# Patient Record
Sex: MALE | Race: WHITE
[De-identification: names, ages, dates, MRNs, and addresses within clinical notes are randomized per-mention and may not be internally consistent; named-entity substitution may affect disease eponyms.]

---

## 2017-07-14 ENCOUNTER — HOSPITAL ENCOUNTER (INPATIENT)
Dept: HOSPITAL 80 - F3E | Age: 62
LOS: 2 days | Discharge: HOME | DRG: 669 | End: 2017-07-16
Attending: SPECIALIST | Admitting: SPECIALIST
Payer: COMMERCIAL

## 2017-07-14 DIAGNOSIS — I10: ICD-10-CM

## 2017-07-14 DIAGNOSIS — Z79.4: ICD-10-CM

## 2017-07-14 DIAGNOSIS — N99.71: ICD-10-CM

## 2017-07-14 DIAGNOSIS — N17.9: ICD-10-CM

## 2017-07-14 DIAGNOSIS — I25.10: ICD-10-CM

## 2017-07-14 DIAGNOSIS — E10.9: ICD-10-CM

## 2017-07-14 DIAGNOSIS — Z95.5: ICD-10-CM

## 2017-07-14 DIAGNOSIS — N20.1: Primary | ICD-10-CM

## 2017-07-14 DIAGNOSIS — Z86.73: ICD-10-CM

## 2017-07-14 DIAGNOSIS — G47.33: ICD-10-CM

## 2017-07-14 DIAGNOSIS — Z96.41: ICD-10-CM

## 2017-07-14 LAB
ANION GAP SERPL CALC-SCNC: 12 MEQ/L (ref 8–16)
CALCIUM SERPL-MCNC: 9.6 MG/DL (ref 8.5–10.4)
CHLORIDE SERPL-SCNC: 105 MEQ/L (ref 97–110)
CO2 SERPL-SCNC: 23 MEQ/L (ref 22–31)
CREAT SERPL-MCNC: 1.6 MG/DL (ref 0.7–1.3)
ERYTHROCYTE [DISTWIDTH] IN BLOOD BY AUTOMATED COUNT: 12.7 % (ref 11.5–15.2)
EST. AVERAGE GLUCOSE BLD GHB EST-MCNC: 148 MG/DL (ref 68–126)
GFR SERPL CREATININE-BSD FRML MDRD: 44 ML/MIN/{1.73_M2}
GLUCOSE SERPL-MCNC: 105 MG/DL (ref 70–100)
HBA1C MFR BLD: 6.8 % (ref 4–6)
HCT VFR BLD CALC: 35.3 % (ref 40–51)
HGB BLD-MCNC: 11.9 G/DL (ref 13.7–17.5)
LIPEMIA HEMOLYSIS FLAG: 80 (ref 0–99)
MCH RBC BLDCO QN: 30.9 PG (ref 27.9–34.1)
MCHC RBC AUTO-ENTMCNC: 33.7 G/DL (ref 32.4–36.7)
MCV RBC AUTO: 91.7 FL (ref 81.5–99.8)
PLATELET # BLD: 163 10^3/UL (ref 150–400)
PLATELET CLUMPS FLAG: 0 (ref 0–99)
POTASSIUM SERPL-SCNC: 4.3 MEQ/L (ref 3.5–5.2)
RBC # BLD AUTO: 3.85 10^6/UL (ref 4.4–6.38)
SODIUM SERPL-SCNC: 140 MEQ/L (ref 134–144)

## 2017-07-14 PROCEDURE — C2625 STENT, NON-COR, TEM W/DEL SY: HCPCS

## 2017-07-14 PROCEDURE — G0378 HOSPITAL OBSERVATION PER HR: HCPCS

## 2017-07-14 PROCEDURE — C1726 CATH, BAL DIL, NON-VASCULAR: HCPCS

## 2017-07-14 PROCEDURE — C1758 CATHETER, URETERAL: HCPCS

## 2017-07-14 PROCEDURE — C1769 GUIDE WIRE: HCPCS

## 2017-07-14 NOTE — GCON
[f rep st]



                                                                    CONSULTATION





DATE OF CONSULTATION:  07/14/2017



REFERRING PHYSICIAN:  Chip Valencia MD



REASON FOR CONSULTATION:  Medical management.



HISTORY OF PRESENT ILLNESS:  This is a 61-year-old male with a history of type 1 diabetes, coronary 
artery disease and previous strokes among other chronic medical problems.  He does have a history of
 nephrolithiasis.  He has been having pain for about a week and went to the [facility] Emergency
 Department.  At that time he was diagnosed with a large right ureteral stone.  He is being admitted
 for definitive procedure tomorrow by Dr. Valencia.  He says he has had some low-grade fevers, but no 
chills.  He had some pain that started in his groin and now remains in his flank although he is comf
ortable at the time.  He has had some nausea and vomiting.  No chest pain or shortness of breath.



REVIEW OF SYSTEMS:  A 10-point review of systems was obtained, and other than stated above is negati
ve.



PAST MEDICAL HISTORY:  

1.  Previous nephrolithiasis.

2.  History of coronary artery disease with multiple stents, with the last stent done in March of 20
16.

3.  Type 1 diabetes on insulin pump.

4.  History of CVA x3.

5.  History of patent foramen ovale.

6.  Obstructive sleep apnea on CPAP.

7.  History of myasthenia gravis.

8.  Hyperthyroidism.

9.  Hypertension.

10.  Hyperlipidemia.



MEDICATIONS:  Reviewed.



SOCIAL HISTORY:  No smoking or alcohol.



FAMILY HISTORY:  Dad with type 2 diabetes.  No coronary artery disease.



PAST SURGICAL HISTORY:  

1.  Hiatal hernia x 2.

2.  Right shoulder surgery. 

3.  Carpal tunnel surgery. 

4.  Right inguinal surgery.

5.  Kidney stones.



PHYSICAL EXAM:  VITAL SIGNS:  Afebrile, blood pressure is 160/66, heart rate 86, oxygen saturation 9
1% on room air.  GENERAL:  The patient is well developed, in no apparent distress.  HEENT:  Nonicter
ic sclerae.  Extraocular movements intact.  Moist mucous membranes.  NECK:  Supple.  No thyromegaly.
  LUNGS:  Good effort.  Clear to auscultation bilaterally.  CARDIOVASCULAR:  Regular rate and rhythm
.  No murmurs, gallops.  ABDOMEN:  Positive bowel sounds.  Soft, nontender, nondistended.  No hepato
splenomegaly.  EXTREMITIES:  No clubbing, cyanosis, or edema.  SKIN:  Without rash.  Warm, intact.  
NEUROLOGIC:  Alert and oriented x3.  Moving all 4 extremities equally.  PSYCHIATRIC:  Normal affect.



DIAGNOSTIC DATA:  Labs: White count 8, hemoglobin 12.  Chemistry does show creatinine 1.6 with a bas
gopal of 0.9.



ASSESSMENT:  This is a 61-year-old male presenting with obstructing right kidney stone.

1.  Right kidney stone.  The patient will be undergoing a procedure tomorrow.  I assume that his wor
kup for infection was negative at [facility] a few days ago.  We will check a urinalysis while wendi perez is here and make sure there is no infection.

2.  History of type 1 diabetes with some element of insulin resistance.  The patient is comfortable 
managing his insulin pump.  We will continue to let him do that while in the hospital. I have held h
is metformin because his creatinine is a little bit elevated.

3.  History of coronary artery disease.  This is stable.  Since his last stent was over a year ago, 
we will hold his Plavix and hold aspirin for a day or 2 and around the procedure.

4.  Acute renal failure.  Probably due to obstructing stone.  We will continue to monitor after his 
stone is removed.

5.  History of obstructive sleep apnea.  We will continue CPAP at night.  

6.  Hypertension.  We will continue metoprolol.  Will hold losartan due to his renal failure.

7.  Deep venous thrombosis prophylaxis will start after surgery. 

Thank you for this consultation.  We will follow along with you.





Job #:  [id number]

## 2017-07-15 LAB
% IMMATURE GRANULYOCYTES: 0.4 % (ref 0–1.1)
ABSOLUTE IMMATURE GRANULOCYTES: 0.03 10^3/UL (ref 0–0.1)
ABSOLUTE NRBC COUNT: 0 10^3/UL (ref 0–0.01)
ADD DIFF?: NO
ADD MORPH?: NO
ADD SCAN?: NO
ANION GAP SERPL CALC-SCNC: 11 MEQ/L (ref 8–16)
ATYPICAL LYMPHOCYTE FLAG: 10 (ref 0–99)
BACTERIA #/AREA URNS HPF: (no result) /HPF
CALCIUM SERPL-MCNC: 9 MG/DL (ref 8.5–10.4)
CHLORIDE SERPL-SCNC: 104 MEQ/L (ref 97–110)
CO2 SERPL-SCNC: 25 MEQ/L (ref 22–31)
COLOR UR: (no result)
CREAT SERPL-MCNC: 1.7 MG/DL (ref 0.7–1.3)
ERYTHROCYTE [DISTWIDTH] IN BLOOD BY AUTOMATED COUNT: 12.9 % (ref 11.5–15.2)
FRAGMENT RBC FLAG: 0 (ref 0–99)
GFR SERPL CREATININE-BSD FRML MDRD: 41 ML/MIN/{1.73_M2}
GLUCOSE SERPL-MCNC: 79 MG/DL (ref 70–100)
HCT VFR BLD CALC: 33.8 % (ref 40–51)
HGB BLD-MCNC: 11.1 G/DL (ref 13.7–17.5)
LEFT SHIFT FLG: 0 (ref 0–99)
LIPEMIA HEMOLYSIS FLAG: 80 (ref 0–99)
MCH RBC BLDCO QN: 30.6 PG (ref 27.9–34.1)
MCHC RBC AUTO-ENTMCNC: 32.8 G/DL (ref 32.4–36.7)
MCV RBC AUTO: 93.1 FL (ref 81.5–99.8)
MUCOUS THREADS #/AREA URNS LPF: (no result) /LPF
NITRITE UR QL STRIP: NEGATIVE
NRBC-AUTO%: 0 % (ref 0–0.2)
PH UR STRIP: 5 [PH] (ref 5–7.5)
PLATELET # BLD: 161 10^3/UL (ref 150–400)
PLATELET CLUMPS FLAG: 0 (ref 0–99)
PMV BLD AUTO: 9.9 FL (ref 8.7–11.7)
POTASSIUM SERPL-SCNC: 4.4 MEQ/L (ref 3.5–5.2)
RBC # BLD AUTO: 3.63 10^6/UL (ref 4.4–6.38)
RBC #/AREA URNS HPF: (no result) /HPF (ref 0–3)
SODIUM SERPL-SCNC: 140 MEQ/L (ref 134–144)
SP GR UR STRIP: 1.01 (ref 1–1.03)
WBC #/AREA URNS HPF: (no result) /HPF (ref 0–3)

## 2017-07-15 PROCEDURE — 0T768DZ DILATION OF RIGHT URETER WITH INTRALUMINAL DEVICE, VIA NATURAL OR ARTIFICIAL OPENING ENDOSCOPIC: ICD-10-PCS | Performed by: SPECIALIST

## 2017-07-15 PROCEDURE — 0TC68ZZ EXTIRPATION OF MATTER FROM RIGHT URETER, VIA NATURAL OR ARTIFICIAL OPENING ENDOSCOPIC: ICD-10-PCS | Performed by: SPECIALIST

## 2017-07-15 RX ADMIN — PHENAZOPYRIDINE HYDROCHLORIDE SCH MG: 200 TABLET ORAL at 20:58

## 2017-07-15 RX ADMIN — Medication PRN MCG: at 11:40

## 2017-07-15 RX ADMIN — PHENAZOPYRIDINE HYDROCHLORIDE SCH MG: 200 TABLET ORAL at 13:12

## 2017-07-15 RX ADMIN — ATORVASTATIN CALCIUM SCH MG: 10 TABLET, FILM COATED ORAL at 13:14

## 2017-07-15 RX ADMIN — PHENAZOPYRIDINE HYDROCHLORIDE SCH MG: 200 TABLET ORAL at 15:50

## 2017-07-15 RX ADMIN — METOPROLOL SUCCINATE SCH MG: 25 TABLET, EXTENDED RELEASE ORAL at 13:15

## 2017-07-15 RX ADMIN — Medication PRN MCG: at 11:18

## 2017-07-15 RX ADMIN — TAMSULOSIN HYDROCHLORIDE SCH MG: 0.4 CAPSULE ORAL at 13:13

## 2017-07-15 NOTE — PDANEPAE
ANE History of Present Illness





61-yo M for Ureteroscopy





ANE Past Medical History





- Cardiovascular History


Hx Hypertension: Yes


Hx Arrhythmias: No


Hx Chest Pain: No


Hx Coronary Artery / Peripheral Vascular Disease: Yes


Hx CHF / Valvular Disease: No


Hx Palpitations: No


Cardiovascular History Comment: HTN.  CAD.  MI WITH STENT PLACEMENT X1





- Pulmonary History


Hx COPD: No


Hx Asthma/Reactive Airway Disease: No


Hx Recent Upper Respiratory Infection: No


Hx Oxygen in Use at Home: No


Hx Sleep Apnea: Yes


Sleep Apnea Screening Result - Last Documented: Positive


Pulmonary History Comment: ELEAZAR POSITIVE





- Neurologic History


Hx Cerebrovascular Accident: Yes


Hx Seizures: Yes


Hx Dementia: No


Neurologic History Comment: CVA IN 2006 X3.  SEIZURES FOR 6 MONTHS FOLLOWING CVA





- Endocrine History


Hx Diabetes: Yes


Endocrine History Comment: DM





- Renal History


Hx Renal Disorders: Yes


Renal History Comment: RIGHT URETEROSCOPY WITH KOREY 4/2/15





- Liver History


Hx Hepatic Disorders: No





- Neurological & Psychiatric Hx


Hx Neurological and Psychiatric Disorders: No





- Cancer History


Hx Cancer: No





- Congenital Disorder History


Hx Congenital Disorders: No





- GI History


Hx Gastrointestinal Disorders: No





- Other Health History


Other Health History: NONE





- Chronic Pain History


Chronic Pain: No





- Surgical History


Prior Surgeries: RIGHT URETEROSCOPY WITH DR NAIR 4/2/15





PABLO Review of Systems





- Exercise capacity


METS (RN): 3 METS





ANE Patient History





- Allergies


Allergies/Adverse Reactions: 








caffeine Allergy (Verified 04/01/15 19:19)


 


Sulfa (Sulfonamide Antibiotics) Allergy (Verified 04/01/15 11:52)


 








- Home Medications


Home Medications: 








Multivitamins [Multivitamin (*)] 1 each PO DAILY 04/01/15 [Last Taken 07/14/17]


Aspirin [Aspirin 325 mg (*)] 325 mg PO DAILY 10/28/15 [Last Taken 07/13/17]


Metoprolol Succinate Xr [Toprol Xl 25 mg (*)] 25 mg PO DAILY 10/28/15 [Last 

Taken 07/14/17]


Simvastatin [Zocor] 20 mg PO DAILY 10/28/15 [Last Taken 07/14/17]


Insulin Pump, Patient Own 1 ea MISC AD 09/09/16 [Last Taken 07/14/17]


metFORMIN HCL [Glucophage 500 mg (*)] 1,000 mg PO BIDMEAL 09/09/16 [Last Taken 

07/14/17 1 TAB]


ARIPiprazole [Abilify 5 mg (*)] 5 mg PO DAILY 07/14/17 [Last Taken 07/14/17]


Cholecalciferol Vit D3 [Vitamin D3 (*)] 2,000 units PO DAILY 07/14/17 [Last 

Taken 07/14/17]


FLUoxetine [Prozac 10 MG (*)] 10 mg PO HS MDD 30MG 07/14/17 [Last Taken 07/13/17

]


FLUoxetine [Prozac 20 MG (*)] 20 mg PO HS MDD 30MG 07/14/17 [Last Taken 07/14/17

]


Losartan Potassium 100 mg PO DAILY 07/14/17 [Last Taken 07/14/17]


Omega-3 Fatty Acids [Fish Oil 1000 mg (*)] 1,000 mg PO TID 07/14/17 [Last Taken 

Unknown]


Tamsulosin HCl [Flomax 0.4 MG (*)] 0.4 mg PO DAILY 07/14/17 [Last Taken 07/14/17

]








- NPO status


NPO Since - Liquids (Date): 07/15/17


NPO Since - Liquids (Time): 00:00


NPO Since - Solids (Date): 07/15/17


NPO Since - Solids (Time): 00:00





- Smoking Hx


Smoking Status: Never smoked





- Family Anes Hx


Family Hx Anesthesia Complications: NONE





ANE Labs/Vital Signs





- Labs


Result Diagrams: 


 07/15/17 04:30





 07/15/17 04:30





- Vital Signs


Blood Pressure: 137/65


Heart Rate: 84


Respiratory Rate: 16


O2 Sat (%): 93


Height: 172.72 cm


Weight: 116.2 kg





ANE Physical Exam





- Airway


Neck exam: FROM


Mallampati Score: Class 2


Mouth exam: normal dental/mouth exam





- Pulmonary


Pulmonary: clear to auscultation





- Cardiovascular


Cardiovascular: regular rate and rhythym





- ASA Status


ASA Status: III





ANE Anesthesia Plan


Anesthesia Plan: GA w LMA

## 2017-07-15 NOTE — POSTANESTH
Post Anesthetic Evaluation


Cardiovascular Status: Normal, Stable


Respiratory Status: Normal, Stable


Level of Consciousness/Mental Status: Can Participate in Eval, Alert and 

Oriented


Pain Control: Adequate, Prn Tx Ordered


Nausea/Vomiting Control: Adequate, Prn Tx Ordered

## 2017-07-15 NOTE — HOSPPROG
Hospitalist Progress Note


Assessment/Plan: 





# nephrolithiasis s/p stone extraction, stent placement by Dr Valencia


   - small distal ureteral perforation


# KARLY - SCr still elevated; unclear if d/t stone or pre-renal


   - bolus 1L, check ULytes, recheck


# DM1 - on insulin pump, A1c 6.8, glucs ok


# CAD s/p stents, last 3/2016 - temporarily holding asa/plavix, restart soon


   - cont statin/metop


# CVA - statin/plavix/asa


# htn - metop








Subjective: s/p lithotripsy; denies pain


Objective: 


 Vital Signs











Temp Pulse Resp BP Pulse Ox


 


 36.6 C   88   16   130/65 H  85 L


 


 07/15/17 14:14  07/15/17 14:14  07/15/17 14:14  07/15/17 14:14  07/15/17 14:14








 Laboratory Results





 07/15/17 04:30 





 07/15/17 04:30 





 











 07/14/17 07/15/17 07/16/17





 05:59 05:59 05:59


 


Intake Total   1586


 


Output Total  700 850


 


Balance  -700 736








chart reviewed; op note reviewed





- Physical Exam


Constitutional: no apparent distress, appears nourished


Cardiovascular: regular rate and rhythym, no murmur, rub, or gallop


Respiratory: no respiratory distress, no rales or rhonchi, clear to auscultation


Gastrointestinal: normoactive bowel sounds, soft, non-tender abdomen, no 

palpable masses, other (umbilical hernia)





ICD10 Worksheet


Patient Problems: 


 Problems











Problem Status Onset


 


Obstructive uropathy Acute  


 


CAD (coronary artery disease) Acute  


 


History of coronary artery disease Acute  


 


Chest pain Acute

## 2017-07-15 NOTE — GOP
[f 
rep st]



                                                                OPERATIVE REPORT





DATE OF OPERATION:  07/15/2017



SURGEON:  Chip Valencia MD



ANESTHESIA:  Laryngeal mask.



PREOPERATIVE DIAGNOSIS:  Symptomatic large right ureteral calculus.



POSTOPERATIVE DIAGNOSIS:  Symptomatic multiple right ureteral calculi.



PROCEDURE PERFORMED:  

1.  Cystourethroscopy, right retrograde pyelography.

2.  Right ureteroscopy with holmium laser calculus lithotripsy.

3.  Right ureteral stent placement (4.7-[place] multilink).



FINDINGS:  Multiple right ureteral calculi, addressed as noted below.



SPECIMENS:  None.



ESTIMATED BLOOD LOSS:  Minimal.



INDICATIONS:  This gentleman was admitted yesterday from the clinic due to 
symptoms related to a large ureteral calculus seen on CT scan.  The patient 
presents for operative management at this time.  The indications for the 
procedures, as well as potential risks and complications, were discussed with 
the patient preoperatively.  He appeared to understand, his questions were 
answered, and he wished to proceed.  Written informed surgical consent was 
thereafter obtained.



DESCRIPTION OF PROCEDURE:  Once the patient was brought to the operating room, 
he was administered laryngeal mask anesthesia.  He was carefully placed in the 
dorsal lithotomy position on the cystoscopic table.  The genital area was 
sterilely prepped with Betadine scrub and paint then draped in usual sterile 
fashion.  Cystoscopy was performed with a 30-degree lens through a 22-[place]er 
sheath.  Anterior urethra revealed no abnormalities.  Posterior urethra 
revealed mild lateral lobe BPH.  The bladder was moderately trabeculated 
without any areas of abnormal erythema, tumors, nor foreign bodies.  Ureteral 
orifices were normal in regards to shape and position.  Spot fluoroscopy was 
used to try and identify the location of the calculus prior to injection of 
contrast, but the calculus could not be easily visualized.  I then injected 
contrast into the right ureter through a 5-[place] open-ended ureteral catheter 
to perform retrograde pyelography.  This revealed no passage of contrast 
proximal to the distal ureter at about the level of the pelvic inlet.  I then 
was able to pass a 0.035-inch angle-tipped hydrophilic guidewire up the right 
ureter, with the aid of the 5-[place] open-ended ureteral catheter, and advanced 
it proximal to the obstruction and into the renal collecting system as noted 
fluoroscopically.  Immediately upon doing so, there was a significant amount of 
brownish urine that exited the ureteral orifice that was likely due to 
significant urinary stasis and obstruction from the large ureteral calculus.  I 
then dilated the ureter distal to the calculus with a 4 cm balloon by 
maintaining a pressure of 16 atmospheres for about 4 minutes.  The balloon 
dilator and cystoscope were then removed while keeping the guidewire in place.  
Semi-rigid ureteroscopy was performed alongside the guidewire. 



The ureteral mucosa appeared to be fairly friable in general.  I reached the 
portion of the ureter that was at the level of the pelvic inlet and there was 
significant edema and inflammation at this location.  I carefully advanced the 
ureteroscope further proximally, with the aid of an additional guidewire 
through the ureteroscope.  Upon doing this, a small perforation of the ureter 
occurred near the pelvic inlet in the posteromedial position.  I was able to re-
advance the ureteroscope through the true lumen without difficulty thereafter, 
and without inducing further injury to the ureter.  Upon passing through this 
inflamed and edematous portion of the ureter, I was able identify a very large 
calculus in the ureter along the mid portion.  A 365-micron holmium laser fiber 
was used to fragment this calculus into as small pieces as possible.  Upon 
doing this, I then identified several other smaller calculi that were just 
proximal to the large dominant calculus.  These smaller calculi measured 
roughly 2-3 mm in size each.  I used the holmium laser fiber to fragment all 
the calculi that were visualized into as small fragments as possible.  I did 
not utilize a stone basket in order to minimize risk of further injury to the 
ureter.  Once the calculi had been successfully fragmented, the ureteroscope 
was removed and the cystoscope was back-loaded over the guidewire.  The 5-
[place] open-ended ureteral catheter was then used to perform retrograde 
pyelography on the right side.  This revealed mild-to-moderate hydronephrosis 
and some mild hydroureter.  There was no obvious extravasation seen from the 
ureter at this point.  It should also be noted that as I was backing out the 
ureteroscope following laser lithotripsy, I could not definitively see the 
location of the small perforation.  Once retrograde pyelography was completed, 
a 4.7-[place] multilength hydrophilic ureteral stent was advanced over the 
guidewire until it was properly positioned as seen fluoroscopically in the 
kidney and cystoscopically in the bladder.  The urine return at this point was 
light pink.  The instruments were removed and 20 cc of 2% lidocaine injected 
transurethrally for postoperative analgesic purposes.  The patient was then 
awakened, transferred to his bed, then taken to the recovery room.  He 
tolerated the procedure well overall.



COMPLICATIONS:  Small ureteral perforation along the distal aspect in the 
posteromedial portion of the ureter.



DISPOSITION:  He was transferred to the recovery room in stable condition.  He 
will be discharged once he has been deemed to be doing well, either this 
evening or tomorrow.





Job #:  [id number]

MTDD

## 2017-07-15 NOTE — POSTOPPROG
Post Op Note


Date of Operation: 07/15/17 (H&P # [id number])


Surgeon: Chip Valencia (Op # [id number])


Anesthesia: LMA


Pre-op Diagnosis: Right ureteral calculus


Post-op Diagnosis: Multiple right ureteral calculi


Procedure: Ureteroscopy w/ laser lithotripsy, stent placement


Findings: See op note


Inf/Abcess present in the surg proc area at time of surgery?: No


EBL: Minimal


Complications: 





Small distal ureteral perforation


Drains: Other (4.7 Fr. multilength right ureteral stent)

## 2017-07-15 NOTE — GHP
[f rep st]



                                                            HISTORY AND PHYSICAL





DATE OF ADMISSION:  07/14/2017



CHIEF COMPLAINT:  Symptomatic right ureteral calculus.



HISTORY OF PRESENT ILLNESS:  This is a 61-year-old gentleman, well known to my practice with a longs
tanding history of recurrent nephroureterolithiasis.  He started experiencing significant right-side
d flank and abdominal pain earlier this week, for which he has been to the emergency room at [facility] twice.  CT scan at that time revealed a large right midureteral calculus with moder
ate proximal hydronephrosis and hydroureter.  Multiple other renal calculi were also seen.  The augusto
ent presented to my office on 07/14 and was noted to have intermittently severe symptoms.  Because o
f the size of the calculus and concern that he could be developing an infection, the patient was adm
itted to the hospital for further management including surgical treatment of the ureteral calculi.  
The patient did have a low-grade temperature of 100 degrees the day prior to presentation to my offi
ce.  He denies any dysuria, gross hematuria, or changes in his voiding pattern.



PAST MEDICAL HISTORY:  Notable for recurrent nephroureterolithiasis, myasthenia gravis, high blood p
ressure, high cholesterol, heart disease, gastroesophageal reflux disease, diabetes mellitus, bipola
r disorder, history of a cerebrovascular accident x3, depression, patent foramina ovale, seizure dis
order, sleep apnea.



PAST SURGICAL HISTORY:  Includes ureteroscopy on multiple occasions (most recently in March 2015), e
xtracorporeal shockwave lithotripsy in November 2006, multiple cardiac stents placed (most recently 
March 2016), hiatal hernia repair, right inguinal hernia repair in March 2008.



ADMISSION MEDICATIONS:  Include aspirin 325 mg daily, Plavix, Prevacid, simvastatin, metoprolol, los
beverly, insulin pump, fluoxetine.



MEDICAL ALLERGIES:  Sulfa causes anaphylaxis, caffeine causes severe vomiting and diarrhea.



FAMILY HISTORY:  Noncontributory.



SOCIAL HISTORY:  The patient is  and lives in the [place] area.  He has 2 children.  He de
nies use of tobacco products and consumes an occasional beer.



PHYSICAL EXAMINATION:  GENERAL:  Obese white male, lying supine in bed, in no acute distress present
ly.  He appears older than his stated age.  HEENT:  Normocephalic, atraumatic.  VITAL SIGNS:  Stable
, temperature 37.9 Celsius at 6:15 p.m. on admission 7/14.  He has been essentially afebrile since t
hen.  Height 172 cm, weight 116 kg, BMI 39.  ABDOMEN:  Obese with large umbilical hernia.  Mild tend
erness is noted on the right side without peritoneal signs.  HEART:  Regular rate.  CHEST:  Unlabore
d respiratory pattern.  GENITALIA:  Normal phallus and scrotal structures.  NEUROLOGIC:  He is alert
 and oriented and answers all questions appropriately with normal mood and affect.



ADMISSION LABORATORY:  Notable for white blood cell count 8000, chemistry panel notable for creatini
ne 1.6.  Urinalysis in the office was notable for microhematuria but negative for white blood cells 
or bacteria.



RADIOGRAPHIC STUDIES:  [facility] noncontrast abdominopelvic CT scan earlier this week:  Notable
 for mild-to-moderate right hydronephrosis and hydroureter down to a large calculus 16 mm long in th
e midportion of the ureter.  Multiple bilateral nonobstructing renal calculi also seen.



IMPRESSION:  Symptomatic large right ureteral calculus.



PLAN:  The patient will undergo intraoperative management for his ureteral calculus on 07/15/2017.





Job #:  [id number]

## 2017-07-16 VITALS — TEMPERATURE: 97.9 F | OXYGEN SATURATION: 96 %

## 2017-07-16 VITALS — HEART RATE: 76 BPM | DIASTOLIC BLOOD PRESSURE: 68 MMHG | SYSTOLIC BLOOD PRESSURE: 158 MMHG | RESPIRATION RATE: 22 BRPM

## 2017-07-16 LAB
ANION GAP SERPL CALC-SCNC: 9 MEQ/L (ref 8–16)
CALCIUM SERPL-MCNC: 8.6 MG/DL (ref 8.5–10.4)
CHLORIDE SERPL-SCNC: 105 MEQ/L (ref 97–110)
CO2 SERPL-SCNC: 24 MEQ/L (ref 22–31)
CREAT SERPL-MCNC: 1.1 MG/DL (ref 0.7–1.3)
GFR SERPL CREATININE-BSD FRML MDRD: > 60 ML/MIN/{1.73_M2}
GLUCOSE SERPL-MCNC: 190 MG/DL (ref 70–100)
POTASSIUM SERPL-SCNC: 4.7 MEQ/L (ref 3.5–5.2)
SODIUM SERPL-SCNC: 138 MEQ/L (ref 134–144)

## 2017-07-16 RX ADMIN — METOPROLOL SUCCINATE SCH MG: 25 TABLET, EXTENDED RELEASE ORAL at 08:17

## 2017-07-16 RX ADMIN — ATORVASTATIN CALCIUM SCH MG: 10 TABLET, FILM COATED ORAL at 08:16

## 2017-07-16 RX ADMIN — TAMSULOSIN HYDROCHLORIDE SCH MG: 0.4 CAPSULE ORAL at 08:16

## 2017-07-16 RX ADMIN — PHENAZOPYRIDINE HYDROCHLORIDE SCH MG: 200 TABLET ORAL at 08:16

## 2017-07-31 ENCOUNTER — HOSPITAL ENCOUNTER (OUTPATIENT)
Dept: HOSPITAL 80 - FIMAGING | Age: 62
End: 2017-07-31
Attending: INTERNAL MEDICINE
Payer: COMMERCIAL

## 2017-07-31 DIAGNOSIS — M22.42: ICD-10-CM

## 2017-07-31 DIAGNOSIS — M23.232: Primary | ICD-10-CM

## 2017-07-31 DIAGNOSIS — M25.462: ICD-10-CM

## 2017-07-31 DIAGNOSIS — M76.32: ICD-10-CM

## 2017-10-13 ENCOUNTER — HOSPITAL ENCOUNTER (OUTPATIENT)
Dept: HOSPITAL 80 - FIMAGING | Age: 62
End: 2017-10-13
Attending: INTERNAL MEDICINE
Payer: COMMERCIAL

## 2017-10-13 DIAGNOSIS — Z01.818: Primary | ICD-10-CM

## 2017-10-13 DIAGNOSIS — I25.10: ICD-10-CM

## 2017-10-13 PROCEDURE — A9500 TC99M SESTAMIBI: HCPCS

## 2017-10-13 PROCEDURE — 93017 CV STRESS TEST TRACING ONLY: CPT

## 2017-10-13 PROCEDURE — 78452 HT MUSCLE IMAGE SPECT MULT: CPT

## 2017-10-13 NOTE — PDCARST
CAR Stress Test Results


Type of Stress Test: Lexiscan stress test


Indication: preop/ CAD


Description of Procedure: After informed consent was obtained, pt was 

established to ECG, blood pressure, HR and oximetry monitoring.  STRESS EKG AND 

HEMODYNAMIC DATA.  Resting heart rate: 82  BPM.  Resting ECG: SR.  Resting 

blood pressure:  136/60 mmHg.  O2 saturation at rest: 92%.  Peak heart rate: 82

  BPM.  Peak blood pressure:    158/60  mmHg.  Arrhythmias:  none.  Symptoms: 

The patient experienced no typical symptoms of angina during stress or 

recovery.  Stress/Infusion ECG: No change in rhythm with no significant ST/T 

wave changes.  Stress/infusion O2 saturation: 98%


Impression: Uneventful Lexiscan infusion


Conclusion: Await nuclear images.

## 2017-10-18 ENCOUNTER — HOSPITAL ENCOUNTER (OUTPATIENT)
Dept: HOSPITAL 80 - FIMAGING | Age: 62
Discharge: HOME | End: 2017-10-18
Attending: SPECIALIST
Payer: COMMERCIAL

## 2017-10-18 VITALS
SYSTOLIC BLOOD PRESSURE: 135 MMHG | OXYGEN SATURATION: 90 % | RESPIRATION RATE: 18 BRPM | DIASTOLIC BLOOD PRESSURE: 73 MMHG

## 2017-10-18 VITALS — HEART RATE: 90 BPM | TEMPERATURE: 98.2 F

## 2017-10-18 DIAGNOSIS — E11.9: ICD-10-CM

## 2017-10-18 DIAGNOSIS — I25.10: ICD-10-CM

## 2017-10-18 DIAGNOSIS — Z46.6: Primary | ICD-10-CM

## 2017-10-18 DIAGNOSIS — Z86.73: ICD-10-CM

## 2017-10-18 DIAGNOSIS — N20.0: ICD-10-CM

## 2017-10-18 PROCEDURE — 50432 PLMT NEPHROSTOMY CATHETER: CPT

## 2017-10-18 PROCEDURE — C1729 CATH, DRAINAGE: HCPCS

## 2017-10-18 PROCEDURE — C1769 GUIDE WIRE: HCPCS

## 2017-10-18 PROCEDURE — 0T9030Z DRAINAGE OF RIGHT KIDNEY WITH DRAINAGE DEVICE, PERCUTANEOUS APPROACH: ICD-10-PCS | Performed by: RADIOLOGY

## 2017-10-18 NOTE — PDANEPAE
ANE History of Present Illness





62 year old with kidney stones





ANE Past Medical History





- Cardiovascular History


Hx Hypertension: Yes


Hx Arrhythmias: No


Hx Chest Pain: No


Hx Coronary Artery / Peripheral Vascular Disease: Yes


Hx CHF / Valvular Disease: No


Hx Palpitations: No


Cardiovascular History Comment: HTN.  CAD.  MI WITH STENT PLACEMENT X1





- Pulmonary History


Hx COPD: No


Hx Asthma/Reactive Airway Disease: No


Hx Recent Upper Respiratory Infection: No


Hx Oxygen in Use at Home: No


Hx Sleep Apnea: Yes


Sleep Apnea Screening Result - Last Documented: Positive


Pulmonary History Comment: ELEAZAR POSITIVE





- Neurologic History


Hx Cerebrovascular Accident: Yes


Hx Seizures: Yes


Hx Dementia: No


Neurologic History Comment: CVA IN 2006 X3.  SEIZURES FOR 6 MONTHS FOLLOWING CVA





- Endocrine History


Hx Diabetes: Yes


Endocrine History Comment: DM





- Renal History


Hx Renal Disorders: Yes


Renal History Comment: RIGHT URETEROSCOPY WITH KOREY 4/2/15





- Liver History


Hx Hepatic Disorders: No





- Neurological & Psychiatric Hx


Hx Neurological and Psychiatric Disorders: No





- Cancer History


Hx Cancer: No





- Congenital Disorder History


Hx Congenital Disorders: No





- GI History


Hx Gastrointestinal Disorders: No


Gastrointestinal History Comment: Khoury's esophagus-stable





- Other Health History


Other Health History: NONE





- Chronic Pain History


Chronic Pain: No





- Surgical History


Prior Surgeries: RIGHT URETEROSCOPY WITH DR NAIR 4/2/15





ANE Review of Systems


Review of systems is: negative


Review of Systems: 








- Exercise capacity


METS (RN): 4 METS





ANE Patient History





- Allergies


Allergies/Adverse Reactions: 








caffeine Allergy (Verified 10/17/17 12:33)


 Vomiting


linaclotide [From Linzess] Allergy (Verified 10/17/17 12:33)


 Abdominal Cramping


Sulfa (Sulfonamide Antibiotics) Allergy (Verified 10/17/17 12:33)


 Anaphylaxis








- Home Medications


Home Medications: 








Multivitamins [Multivitamin (*)] 1 each PO DAILY 04/01/15 [Last Taken 10/07/17 

08:00]


Aspirin [Aspirin 325 mg (*)] 325 mg PO DAILY 10/28/15 [Last Taken 10/07/17 08:00

]


Metoprolol Succinate Xr [Toprol Xl 25 mg (*)] 25 mg PO DAILY 10/28/15 [Last 

Taken 10/17/17]


Simvastatin [Zocor] 20 mg PO DAILY 10/28/15 [Last Taken 10/17/17]


Insulin Pump, Patient Own 1 ea MISC AD 09/09/16 [Last Taken 10/17/17]


metFORMIN HCL [Glucophage 500 mg (*)] 1,000 mg PO BIDMEAL 09/09/16 [Last Taken 

10/17/17]


ARIPiprazole [Abilify 5 mg (*)] 5 mg PO DAILY 07/14/17 [Last Taken 10/17/17]


FLUoxetine [Prozac 10 MG (*)] 10 mg PO HS MDD 30MG 07/14/17 [Last Taken 10/17/17

]


FLUoxetine [Prozac 20 MG (*)] 20 mg PO HS MDD 30MG 07/14/17 [Last Taken 10/17/17

]


Cholecalciferol Vit D3 [Vitamin D3 2000 units tab (OTC)] 4,000 units PO DAILY 10

/02/17 [Last Taken 10/07/17 08:00]


Losartan Potassium [Cozaar 50 mg (*)] 100 mg PO DAILY 10/02/17 [Last Taken 10/17

/17]


Pantoprazole Sodium [Protonix 40mg (*)] 40 mg PO DAILY 10/02/17 [Last Taken 10/

17/17]


Travoprost Z 0.004% [Travatan Z 0.004% (*)] 1 drops EACHEYE DAILY 10/02/17 [

Last Taken 10/17/17]


buPROPion [Wellbutrin 75mg (*)] 150 mg PO DAILY 10/02/17 [Last Taken 10/17/17]








- Anes Hx


Anes Hx: no prior problems





- Smoking Hx


Smoking Status: Never smoked





- Alcohol Use


Alcohol Use: Occasionally





- Family Anes Hx


Family Hx Anesthesia Complications: NONE





ANE Labs/Vital Signs





- Vital Signs


Height: 172.72 cm


Weight: 115.666 kg





ANE Physical Exam





- Airway


Neck exam: decreased ROM


Mallampati Score: Class 3


Mouth exam: normal dental/mouth exam





- Pulmonary


Pulmonary: no respiratory distress





- Cardiovascular


Cardiovascular: regular rate and rhythym





- ASA Status


ASA Status: III





ANE Anesthesia Plan


Anesthesia Plan: general endotracheal anesthesia

## 2017-10-19 ENCOUNTER — HOSPITAL ENCOUNTER (OUTPATIENT)
Dept: HOSPITAL 80 - F1N | Age: 62
Setting detail: OBSERVATION
LOS: 1 days | Discharge: HOME | End: 2017-10-20
Attending: SPECIALIST | Admitting: SPECIALIST
Payer: COMMERCIAL

## 2017-10-19 VITALS — RESPIRATION RATE: 16 BRPM

## 2017-10-19 DIAGNOSIS — Z95.5: ICD-10-CM

## 2017-10-19 DIAGNOSIS — E11.9: ICD-10-CM

## 2017-10-19 DIAGNOSIS — N20.0: Primary | ICD-10-CM

## 2017-10-19 DIAGNOSIS — I25.10: ICD-10-CM

## 2017-10-19 DIAGNOSIS — I10: ICD-10-CM

## 2017-10-19 DIAGNOSIS — G47.33: ICD-10-CM

## 2017-10-19 DIAGNOSIS — Z86.73: ICD-10-CM

## 2017-10-19 PROCEDURE — 0TC04ZZ EXTIRPATION OF MATTER FROM RIGHT KIDNEY, PERCUTANEOUS ENDOSCOPIC APPROACH: ICD-10-PCS | Performed by: SPECIALIST

## 2017-10-19 PROCEDURE — 50081 PERQ NL/PL LITHOTRP CPLX>2CM: CPT

## 2017-10-19 PROCEDURE — C1725 CATH, TRANSLUMIN NON-LASER: HCPCS

## 2017-10-19 PROCEDURE — G0378 HOSPITAL OBSERVATION PER HR: HCPCS

## 2017-10-19 PROCEDURE — C1769 GUIDE WIRE: HCPCS

## 2017-10-19 PROCEDURE — 0TL33DZ OCCLUSION OF RIGHT KIDNEY PELVIS WITH INTRALUMINAL DEVICE, PERCUTANEOUS APPROACH: ICD-10-PCS | Performed by: SPECIALIST

## 2017-10-19 PROCEDURE — BT111ZZ FLUOROSCOPY OF RIGHT KIDNEY USING LOW OSMOLAR CONTRAST: ICD-10-PCS | Performed by: SPECIALIST

## 2017-10-19 PROCEDURE — 74485 DILATION URTR/URT RS&I: CPT

## 2017-10-19 PROCEDURE — 75984 XRAY CONTROL CATHETER CHANGE: CPT

## 2017-10-19 PROCEDURE — 0TC34ZZ EXTIRPATION OF MATTER FROM RIGHT KIDNEY PELVIS, PERCUTANEOUS ENDOSCOPIC APPROACH: ICD-10-PCS | Performed by: SPECIALIST

## 2017-10-19 PROCEDURE — C1894 INTRO/SHEATH, NON-LASER: HCPCS

## 2017-10-19 PROCEDURE — C1729 CATH, DRAINAGE: HCPCS

## 2017-10-19 PROCEDURE — 50395: CPT

## 2017-10-19 PROCEDURE — 50389 REMOVE RENAL TUBE W/FLUORO: CPT

## 2017-10-19 PROCEDURE — 74425 UROGRAPHY ANTEGRADE RS&I: CPT

## 2017-10-19 PROCEDURE — 0T9030Z DRAINAGE OF RIGHT KIDNEY WITH DRAINAGE DEVICE, PERCUTANEOUS APPROACH: ICD-10-PCS | Performed by: SPECIALIST

## 2017-10-19 RX ADMIN — SODIUM CHLORIDE SCH MLS: 450 INJECTION, SOLUTION INTRAVENOUS at 15:28

## 2017-10-19 RX ADMIN — INSULIN HUMAN SCH: 100 INJECTION, SOLUTION PARENTERAL at 18:11

## 2017-10-19 RX ADMIN — INSULIN HUMAN SCH: 100 INJECTION, SOLUTION PARENTERAL at 22:29

## 2017-10-19 NOTE — PDANEPAE
ANE History of Present Illness





right renal calculi





ANE Past Medical History





- Cardiovascular History


Hx Hypertension: Yes


Hx Arrhythmias: No


Hx Chest Pain: No


Hx Coronary Artery / Peripheral Vascular Disease: Yes


Hx CHF / Valvular Disease: No


Hx Palpitations: No


Cardiovascular History Comment: HTN.  CAD.  MI WITH STENT PLACEMENT X1





- Pulmonary History


Hx COPD: No


Hx Asthma/Reactive Airway Disease: No


Hx Recent Upper Respiratory Infection: No


Hx Oxygen in Use at Home: No


Hx Sleep Apnea: Yes


Sleep Apnea Screening Result - Last Documented: Positive


Pulmonary History Comment: ELEAZAR POSITIVE





- Neurologic History


Hx Cerebrovascular Accident: Yes


Hx Seizures: Yes


Hx Dementia: No


Neurologic History Comment: CVA IN 2006 X3.  SEIZURES FOR 6 MONTHS FOLLOWING CVA





- Endocrine History


Hx Diabetes: Yes


Endocrine History Comment: DM





- Renal History


Hx Renal Disorders: Yes


Renal History Comment: RIGHT URETEROSCOPY WITH KOREY 4/2/15





- Liver History


Hx Hepatic Disorders: No





- Neurological & Psychiatric Hx


Hx Neurological and Psychiatric Disorders: No





- Cancer History


Hx Cancer: No





- Congenital Disorder History


Hx Congenital Disorders: No





- GI History


Hx Gastrointestinal Disorders: No


Gastrointestinal History Comment: Khoury's esophagus-stable





- Other Health History


Other Health History: NONE





- Chronic Pain History


Chronic Pain: No





- Surgical History


Prior Surgeries: RIGHT URETEROSCOPY WITH DR NAIR 4/2/15





PABLO Review of Systems


Review of Systems: 








- Exercise capacity


METS (RN): 4 METS





ANE Patient History





- Allergies


Allergies/Adverse Reactions: 








caffeine Allergy (Verified 10/17/17 12:33)


 Vomiting


linaclotide [From Linzess] Allergy (Verified 10/17/17 12:33)


 Abdominal Cramping


Sulfa (Sulfonamide Antibiotics) Allergy (Verified 10/17/17 12:33)


 Anaphylaxis








- Home Medications


Home Medications: 








RX: Multivitamins [Multivitamin (*)] 1 each PO DAILY 04/01/15 [Last Taken 10/07/

17 08:00]


RX: Aspirin [Aspirin 325 mg (*)] 325 mg PO DAILY 10/28/15 [Last Taken 10/07/17 

08:00]


RX: Metoprolol Succinate Xr [Toprol Xl 25 mg (*)] 25 mg PO DAILY 10/28/15 [Last 

Taken 10/18/17 20:30]


RX: Simvastatin [Zocor] 20 mg PO DAILY 10/28/15 [Last Taken 10/18/17]


RX: Insulin Pump, Patient Own 1 [facility] AD 09/09/16 [Last Taken 10/17/17]


RX: metFORMIN HCL [Glucophage 500 mg (*)] 1,000 mg PO BIDMEAL 09/09/16 [Last 

Taken 10/17/17 18:00]


RX: ARIPiprazole [Abilify 5 mg (*)] 5 mg PO DAILY 07/14/17 [Last Taken 10/17/17]


RX: FLUoxetine [Prozac 10 MG (*)] 10 mg PO HS MDD 30MG 07/14/17 [Last Taken 10/

17/17]


RX: FLUoxetine [Prozac 20 MG (*)] 20 mg PO HS MDD 30MG 07/14/17 [Last Taken 10/

17/17]


Cholecalciferol Vit D3 [Vitamin D3 2000 units tab (OTC)] 4,000 units PO DAILY 10

/02/17 [Last Taken 10/07/17 08:00]


Losartan Potassium [Cozaar 50 mg (*)] 100 mg PO DAILY 10/02/17 [Last Taken 10/19

/17 05:30]


Pantoprazole Sodium [Protonix 40mg (*)] 40 mg PO DAILY 10/02/17 [Last Taken 10/

19/17 05:30]


Travoprost Z 0.004% [Travatan Z 0.004% (*)] 1 drops EACHEYE DAILY 10/02/17 [

Last Taken 10/18/17]


buPROPion [Wellbutrin 75mg (*)] 150 mg PO DAILY 10/02/17 [Last Taken 10/19/17 05

:30]








- NPO status


NPO Since - Liquids (Date): 10/18/17


NPO Since - Liquids (Time): 21:00


NPO Since - Solids (Date): 10/18/17


NPO Since - Solids (Time): 18:00





- Smoking Hx


Smoking Status: Never smoked





- Alcohol Use


Alcohol Use: Occasionally





- Family Anes Hx


Family Hx Anesthesia Complications: NONE





ANE Labs/Vital Signs





- Vital Signs


Blood Pressure: 144/68


Heart Rate: 85


Respiratory Rate: 16


O2 Sat (%): 92


Height: 172.72 cm


Weight: 115.666 kg





ANE Physical Exam





- Airway


Neck exam: decreased ROM


Mallampati Score: Class 3


Mouth exam: normal dental/mouth exam, small mouth opening





- Pulmonary


Pulmonary: no respiratory distress





- Cardiovascular


Cardiovascular: regular rate and rhythym





- ASA Status


ASA Status: III





ANE Anesthesia Plan


Anesthesia Plan: general endotracheal anesthesia

## 2017-10-19 NOTE — POSTOPPROG
Post Op Note


Date of Operation: 10/19/17


Surgeon: Chip Valencia (# [id number])


Anesthesia: GET(General Endotracheal)


Pre-op Diagnosis: > 2 cm volume right nephrolithiasis


Post-op Diagnosis: > 2 cm volume right nephrolithiasis


Procedure: Right PCNL w/ fluoro guidance > 1 hr.


Findings: See op note


Inf/Abcess present in the surg proc area at time of surgery?: No


EBL:  (50 cc)


Complications: 





None


Drains: Other (12 Fr. right nephrostomy tube)


Specimen(s): 





Right renal calculus fragments

## 2017-10-19 NOTE — GOP
[f rep st]



                                                                OPERATIVE REPORT





DATE OF OPERATION:  10/10/2017



SURGEON:  Chip Valencia MD



ANESTHESIA:  General endotracheal.



PREOPERATIVE DIAGNOSIS:  Greater than 2 cm volume, right nephrolithiasis.



POSTOPERATIVE DIAGNOSIS:  Greater than 2 cm volume, right nephrolithiasis.



PROCEDURE PERFORMED:  Right-sided percutaneous nephrostolithotomy with ultrasonic Lithotripter and ha
nd forceps extraction, with fluoroscopic guidance greater than 1 hour.



FINDINGS:  Greater than 2 cm renal stone volume noted within a lower pole posterior calyx and extendi
ng into the renal pelvis.



SPECIMENS:  Right renal calculus fragments.



ESTIMATED BLOOD LOSS:  Less than 50 cc.



INDICATIONS:  This gentleman has large volume bilateral nephrolithiasis.  He presents at this time fo
r right-sided percutaneous nephrostolithotomy as part of a staged process to treat his bilateral neel
l stone disease.  He underwent right-sided nephrostomy tube placement in [facility] yes
terday.  The indications for the procedures as well as potential risks and complications, were discus
sed with the patient preoperatively.  He appeared to understand, his questions were answered, and he 
wished to proceed.  Written informed surgical consent was thereafter obtained.



DESCRIPTION OF PROCEDURE:  The patient was brought to the operating room and administered general end
otracheal anesthesia.  He was carefully placed in the prone position on the operating table.  The rig
ht side of the back and existing nephrostomy tube were prepped and draped in the standard fashion.  I
t should be mentioned that, prior to placing the patient in the prone position, while he was still faulkner
pine and intubated, Pedro catheter was placed to bag drainage without complication.  The back was pre
pped and draped in standard fashion.  Dr. Foster from Interventional Radiology then proceeded to plac
e a balloon occlusion catheter at the ureteropelvic junction, followed by a 32-[place]izen [place] working nephros
copic sheath.  After doing so, I then performed rigid nephroscopy through the nephroscopic sheath.  S
ome dominant calculi were seen in the lower pole calyx and renal pelvis.  This was the same calyx thr
ough which the access was obtained.  I used the ultrasonic Lithotripter to fragment these calculi, fo
llowed by rigid grasping forceps to pull out the visible fragments.  At this point, there were no oth
er calculi seen within the lower pole calyx through which the nephroscopic sheath was contained, nor 
within the renal pelvis.  It should also be mentioned that none of the calculi were visible on C-arm 
fluoroscopy which was used intermittently throughout the case for scope guidance.  



I decided to proceed with flexible nephroscopy.  The flexible cystoscope was then brought onto the 
eld.  I then carefully and systematically evaluated each of the calices within the upper pole, mid po
le, and was able to retroflex the scope in order to see the anterior calices of the lower pole.  No o
ther significant calculi were seen.  There were some Blane's plaques noted diffusely on some of the
 different calices throughout the kidney.  At this point, I was confident that there were no remainin
g sizable calculi within the right renal collecting system.  The surgical procedure was terminated at
 this time.  



Dr. Foster then proceeded to place a 12-[place] nephrostomy tube in the renal pelvis.  It was secured 
to the skin with a 2-0 silk suture, then dressed appropriately with gauze and ski slope dressing.  Th
e nephrostomy tube irrigated manually and the return was reddish at this point.  The nephrostomy tube
 was connected to bag drainage.  The patient was carefully turned over into the supine position on th
e transfer [facility].  He was extubated.  He tolerated the procedure well overall and was transferred to
 the recovery room at this point.  Pedro catheter was left in place.



COMPLICATIONS:  None.



DISPOSITION:  He was transferred to the recovery room in stable condition.  He will be admitted overn
[facility] for postoperative care.





Job #:  [id number]

## 2017-10-20 VITALS
OXYGEN SATURATION: 91 % | DIASTOLIC BLOOD PRESSURE: 58 MMHG | HEART RATE: 87 BPM | TEMPERATURE: 97.8 F | SYSTOLIC BLOOD PRESSURE: 129 MMHG

## 2017-10-20 LAB
ANION GAP SERPL CALC-SCNC: 11 MEQ/L (ref 8–16)
CALCIUM SERPL-MCNC: 8.8 MG/DL (ref 8.5–10.4)
CHLORIDE SERPL-SCNC: 100 MEQ/L (ref 97–110)
CO2 SERPL-SCNC: 26 MEQ/L (ref 22–31)
CREAT SERPL-MCNC: 0.9 MG/DL (ref 0.7–1.3)
ERYTHROCYTE [DISTWIDTH] IN BLOOD BY AUTOMATED COUNT: 13.5 % (ref 11.5–15.2)
GFR SERPL CREATININE-BSD FRML MDRD: > 60 ML/MIN/{1.73_M2}
GLUCOSE SERPL-MCNC: 208 MG/DL (ref 70–100)
HCT VFR BLD CALC: 35.2 % (ref 40–51)
HGB BLD-MCNC: 11.8 G/DL (ref 13.7–17.5)
LIPEMIA HEMOLYSIS FLAG: 80 (ref 0–99)
MCH RBC BLDCO QN: 30.6 PG (ref 27.9–34.1)
MCHC RBC AUTO-ENTMCNC: 33.5 G/DL (ref 32.4–36.7)
MCV RBC AUTO: 91.2 FL (ref 81.5–99.8)
PLATELET # BLD: 154 10^3/UL (ref 150–400)
PLATELET CLUMPS FLAG: 0 (ref 0–99)
POTASSIUM SERPL-SCNC: 4.3 MEQ/L (ref 3.5–5.2)
RBC # BLD AUTO: 3.86 10^6/UL (ref 4.4–6.38)
SODIUM SERPL-SCNC: 137 MEQ/L (ref 134–144)

## 2017-10-20 PROCEDURE — 0TP5X0Z REMOVAL OF DRAINAGE DEVICE FROM KIDNEY, EXTERNAL APPROACH: ICD-10-PCS | Performed by: RADIOLOGY

## 2017-10-20 RX ADMIN — SODIUM CHLORIDE SCH MLS: 450 INJECTION, SOLUTION INTRAVENOUS at 00:56

## 2017-10-20 RX ADMIN — INSULIN HUMAN SCH: 100 INJECTION, SOLUTION PARENTERAL at 09:32

## 2017-10-20 NOTE — GDS
[f rep st]



                                                             DISCHARGE SUMMARY





DATE OF SURGERY:  10/19/2017.



PREOP AND POSTOP DIAGNOSIS:  Right nephrolithiasis.



HOSPITAL COURSE:  The patient was admitted, and had a right-sided percutaneous nephrolithotomy done w
ith laser.  Underwent procedure without difficulty.  He is being discharged home in good condition po
st nephrostogram and nephro tube removal with IR.



PHYSICAL EXAM:  The patient was alert, oriented.  Affect appropriate to situation.  Minimal tendernes
s on right CVA, lightly bloody urine in right neph tube bag.  The patient did have pink urine in Fole
y catheter bag.  Integument normal skin tone.  No rashes or lesions.





Job #:  [id number]

## 2017-10-20 NOTE — ASDISCHSUM
----------------------------------------------

Discharge Information

----------------------------------------------

Plan Status:                                         Medically Cleared to Leave:

Discharge Date:10/20/2017 12:23 PM                   CM D/C Disposition:

ADT D/C Disposition:Home, Routine, Self-Care         Projected Discharge Date:10/20/2017 12:23 PM

Transportation at D/C:                               Discharge Delay Reason:

Follow-Up Date:10/20/2017 12:23 PM                   Discharge Slot:

Final Diagnosis:

----------------------------------------------

Placement Information

----------------------------------------------

----------------------------------------------

Patient Contact Information

----------------------------------------------

Contact Name:AUSTYN                          Relationship:Wife

Address:[address]                             Home [phone]

                                                     Work [phone]

City:[place]                                      Alternate Phone:

State/Zip Code:[place] [address]                              Email:

----------------------------------------------

Financial Information

----------------------------------------------

Financial Class:Medicare Advantage Plans

Primary Plan Desc:[employer]         Primary Plan Number:[id number]

Secondary Plan Desc:                                 Secondary Plan Number:

 

 

----------------------------------------------

Assessment Information

----------------------------------------------

----------------------------------------------

Intervention Information

----------------------------------------------

Intervention Type:*JIM-Signed                       Date of Service:10/19/2017 03:15 PM

Patient Type:Observation                             Staff Member:Robyn Haskins

Hours:                                               Discipline:

Severity:                                            Comment:

## 2017-11-14 ENCOUNTER — HOSPITAL ENCOUNTER (OUTPATIENT)
Dept: HOSPITAL 80 - CIMAGING | Age: 62
End: 2017-11-14
Attending: SPECIALIST
Payer: COMMERCIAL

## 2017-11-14 DIAGNOSIS — N20.0: Primary | ICD-10-CM

## 2017-11-14 DIAGNOSIS — K43.9: ICD-10-CM

## 2017-11-14 DIAGNOSIS — K42.9: ICD-10-CM

## 2018-02-15 ENCOUNTER — HOSPITAL ENCOUNTER (OUTPATIENT)
Dept: HOSPITAL 80 - FSGY | Age: 63
Discharge: HOME | End: 2018-02-15
Attending: SPECIALIST
Payer: COMMERCIAL

## 2018-02-15 VITALS — TEMPERATURE: 98.1 F | HEART RATE: 86 BPM | RESPIRATION RATE: 17 BRPM

## 2018-02-15 VITALS — SYSTOLIC BLOOD PRESSURE: 123 MMHG | DIASTOLIC BLOOD PRESSURE: 60 MMHG | OXYGEN SATURATION: 92 %

## 2018-02-15 DIAGNOSIS — N52.9: ICD-10-CM

## 2018-02-15 DIAGNOSIS — E10.9: ICD-10-CM

## 2018-02-15 DIAGNOSIS — R56.9: ICD-10-CM

## 2018-02-15 DIAGNOSIS — I69.998: ICD-10-CM

## 2018-02-15 DIAGNOSIS — Z96.41: ICD-10-CM

## 2018-02-15 DIAGNOSIS — Z87.442: ICD-10-CM

## 2018-02-15 DIAGNOSIS — K22.70: ICD-10-CM

## 2018-02-15 DIAGNOSIS — G47.33: ICD-10-CM

## 2018-02-15 DIAGNOSIS — N20.0: Primary | ICD-10-CM

## 2018-02-15 DIAGNOSIS — I10: ICD-10-CM

## 2018-02-15 DIAGNOSIS — Z95.5: ICD-10-CM

## 2018-02-15 DIAGNOSIS — I25.2: ICD-10-CM

## 2018-02-15 PROCEDURE — 52332 CYSTOSCOPY AND TREATMENT: CPT

## 2018-02-15 PROCEDURE — C1758 CATHETER, URETERAL: HCPCS

## 2018-02-15 PROCEDURE — 52330 CYSTOSCOPY AND TREATMENT: CPT

## 2018-02-15 PROCEDURE — 0T778DZ DILATION OF LEFT URETER WITH INTRALUMINAL DEVICE, VIA NATURAL OR ARTIFICIAL OPENING ENDOSCOPIC: ICD-10-PCS | Performed by: SPECIALIST

## 2018-02-15 PROCEDURE — C2625 STENT, NON-COR, TEM W/DEL SY: HCPCS

## 2018-02-15 PROCEDURE — C1769 GUIDE WIRE: HCPCS

## 2018-02-15 PROCEDURE — 76001: CPT

## 2018-02-15 PROCEDURE — C1726 CATH, BAL DIL, NON-VASCULAR: HCPCS

## 2018-02-15 NOTE — POSTOPPROG
Post Op Note


Date of Operation: 02/15/18


Surgeon: Chip Valencia (# [id number])


Anesthesia: GET(General Endotracheal)


Pre-op Diagnosis: Large left proximal ureteral calculus


Post-op Diagnosis: Large-volume left nephrolithiasis


Procedure: Cysto, left RGP, left ureteroscopy w/ calculus manipulation, stent 

placemen


Findings: See op note


Inf/Abcess present in the surg proc area at time of surgery?: No


EBL: Minimal


Complications: 





None


Drains: Other (4.7 Fr. x 24 cm left ureteral stent)


Specimen(s): 





None

## 2018-02-15 NOTE — PDANEPAE
ANE History of Present Illness





Patient presents for L ureteroscopy, stone extraction





ANE Past Medical History





- Cardiovascular History


Hx Hypertension: Yes


Hx Arrhythmias: No


Hx Chest Pain: No


Hx Coronary Artery / Peripheral Vascular Disease: No


Hx CHF / Valvular Disease: No


Hx Palpitations: No


Cardiovascular History Comment: HTN.  MI WITH STENT PLACEMENT X1





- Pulmonary History


Hx COPD: No


Hx Asthma/Reactive Airway Disease: No


Hx Recent Upper Respiratory Infection: No


Hx Oxygen in Use at Home: No


Hx Sleep Apnea: Yes


Sleep Apnea Screening Result - Last Documented: Positive


Pulmonary History Comment: ELEAZAR  uses CPAP





- Neurologic History


Hx Cerebrovascular Accident: Yes


Hx Seizures: Yes


Hx Dementia: No


Neurologic History Comment: CVA IN 2006 X3.  SEIZURES 2006 -FOLLOWING CVA





- Endocrine History


Hx Diabetes: Yes


Endocrine History Comment: IDDM type 1 on insulin pump





- Renal History


Hx Renal Disorders: Yes


Renal History Comment: multiple kidney stones





- Liver History


Hx Hepatic Disorders: No





- Neurological & Psychiatric Hx


Hx Neurological and Psychiatric Disorders: Yes


Neurological / Psychiatric History Comment: depression, Bi-polar





- Cancer History


Hx Cancer: No





- Congenital Disorder History


Hx Congenital Disorders: No





- GI History


Hx Gastrointestinal Disorders: Yes


Gastrointestinal History Comment: Khoury's esophagus-stable





- Other Health History


Other Health History: NONE





- Chronic Pain History


Chronic Pain: No





- Surgical History


Prior Surgeries: Multiple Right and left  ureteroscopies





ANE Review of Systems


Review of Systems: 








- Exercise capacity


METS (RN): 4 METS





ANE Patient History





- Allergies


Allergies/Adverse Reactions: 








caffeine Allergy (Verified 02/15/18 06:56)


 Vomiting


linaclotide [From Linzess] Allergy (Verified 02/15/18 06:56)


 Abdominal Cramping


Sulfa (Sulfonamide Antibiotics) Allergy (Verified 02/15/18 06:56)


 Anaphylaxis








- Home Medications


Home medications: home medication list seen and reviewed


Home Medications: 








Multivitamins [Multivitamin (*)] 1 each PO DAILY 04/01/15 [Last Taken 02/08/18]


Aspirin [Aspirin 325 mg (*)] 325 mg PO DAILY 10/28/15 [Last Taken 02/05/18]


Metoprolol Succinate Xr [Toprol Xl 25 mg (*)] 25 mg PO DAILY 10/28/15 [Last 

Taken 02/15/18 05:00]


Simvastatin [Zocor] 20 mg PO DAILY 10/28/15 [Last Taken 02/08/18]


Insulin Pump, Patient Own 1 [facility] AD 09/09/16 [Last Taken 02/15/18]


metFORMIN HCL [Glucophage 500 mg (*)] 1,000 mg PO BIDMEAL 09/09/16 [Last Taken 

02/14/18 06:00]


ARIPiprazole [Abilify 5 mg (*)] 5 mg PO DAILY 07/14/17 [Last Taken 02/14/18]


FLUoxetine [Prozac 10 MG (*)] 10 mg PO HS MDD 30MG 07/14/17 [Last Taken 02/15/

18 05:00]


FLUoxetine [Prozac 20 MG (*)] 20 mg PO HS MDD 30MG 07/14/17 [Last Taken 02/15/

18 05:00]


Cholecalciferol Vit D3 [Vitamin D3 2000 units tab (OTC)] 4,000 units PO DAILY 10

/02/17 [Last Taken 02/08/18]


Losartan Potassium [Cozaar 50 mg (*)] 100 mg PO DAILY 10/02/17 [Last Taken 02/14 /18]


Pantoprazole Sodium [Protonix 40mg (*)] 40 mg PO DAILY 10/02/17 [Last Taken 02/

15/18 05:00]


buPROPion XL [Wellbutrin Xl] 300 mg PO DAILY 10/19/17 [Last Taken 02/15/18 05:00

]


HCTZ (*)  02/12/18 [Last Taken 02/09/18]








- NPO status


NPO Status: no food or drink >8 hours


NPO Since - Liquids (Date): 02/15/18


NPO Since - Liquids (Time): 05:00


NPO Since - Solids (Date): 02/14/18


NPO Since - Solids (Time): 19:30





- Smoking Hx


Smoking Status: Never smoked





- Family Anes Hx


Family Hx Anesthesia Complications: NONE





ANE Labs/Vital Signs





- Vital Signs


Blood Pressure: 136/69


Heart Rate: 89


Respiratory Rate: 16


O2 Sat (%): 93


Height: 172.72 cm


Weight: 113.398 kg





ANE Physical Exam





- Airway


Neck exam: FROM


Mallampati Score: Class 3


Mouth exam: normal dental/mouth exam





- Pulmonary


Pulmonary: no respiratory distress





- Cardiovascular


Cardiovascular: regular rate and rhythym





- ASA Status


ASA Status: III





ANE Anesthesia Plan


Anesthesia Plan: general endotracheal anesthesia (RBA discussed)

## 2018-02-15 NOTE — GOP
[f 
rep st]



                                                                OPERATIVE REPORT





DATE OF OPERATION:  02/15/2018



SURGEON:  Chip Valencia MD



ANESTHESIA:  General endotracheal.



PREOPERATIVE DIAGNOSIS:  

1.  Large left proximal ureteral calculus.

2.  Left nephrolithiasis.



POSTOPERATIVE DIAGNOSIS:  Large volume left nephrolithiasis.



PROCEDURE PERFORMED:  

1.  Cystourethroscopy, left retrograde pyelography.

2.  Left ureteroscopy with proximal ureteral calculus manipulation.

3.  Left ureteral stent placement (4.7-[place]izen [place] by 24 cm).



FINDINGS:  Impacted large left ureteral calculus at the ureteropelvic junction.
  This calculus was manipulated into the renal collecting system with 
subsequent ureteroscopy.



SPECIMENS:  None.



ESTIMATED BLOOD LOSS:  Minimal.



INDICATIONS:  This gentleman was recently diagnosed with a large symptomatic 
left proximal ureteral calculus.  The patient also has significant volume left 
nephrolithiasis.  It was recommended that the patient undergo intraoperative 
management for the left ureteral calculus at this time.  The indications for 
the procedures as well as potential risks and complications were discussed with 
the patient preoperatively.  He appeared to understand, his questions were 
answered, and he wished to proceed.  Written informed surgical consent was 
thereafter obtained.



DESCRIPTION OF PROCEDURE:  The patient was brought to the operating room and 
administered general endotracheal anesthesia.  He was carefully placed in the 
dorsal lithotomy position on the cystoscopic table.  The genital area was 
sterilely prepped with Betadine scrub and paint, then draped in the usual 
sterile fashion.  Cystoscopy was performed with 30 and 70 degree lenses through 
a 22-[place]izen [place] sheath.  Anterior urethra revealed no abnormalities.  Posterior 
urethra revealed mild BPH.  The bladder was moderately trabeculated, but 
without areas of abnormal erythema, tumors, nor foreign bodies.  Ureteral 
orifices were normal in regard to shape and position along the trigone. 



A 5-[place]izen [place] open-ended ureteral catheter was used to perform retrograde 
pyelography on the left side.  This revealed a normal ureter until the proximal 
aspect was reached at which point there was no passage of contrast further 
proximally.  My impression was that the large calculus was obstructing flow 
contrast further proximally.  I then passed a 0.035-inch hydrophilic angle-
tipped guidewire up the left ureter, through the ureteral catheter, and 
attempted to advance the guidewire proximal to the perceived location of the 
calculus and into the renal collecting system.  This required a fair amount of 
effort due to the obstructing large proximal calculus.  I was ultimately able 
to advance the guidewire into the renal collecting system as noted 
fluoroscopically.  The ureteral catheter was then removed and the entire length 
of the ureter distal to the perceived location of the calculus was dilated with 
2 separate inflations and deflations of a 10 cm balloon by maintaining a 
pressure of 16 atmospheres for about 4 minutes on each occasion.  The balloon 
dilator and cystoscope were then removed while keeping the guidewire in place.  
Semi-rigid ureteroscopy was performed alongside the guidewire.  



The ureteroscope was advanced all the way to the ureteropelvic junction.  There 
was a fair amount of edema of the ureteral mucosa in the proximal aspect, just 
distal to the ureteropelvic junction, which was likely where the calculus was 
impacted.  However, by this point, the calculus had migrated into the renal 
collecting system.  Concerned that the patient had another large calculus in 
the lower pole of the left kidney, I decided not to perform further endoscopic 
management, with the thought that the patient would best be served at this 
point with percutaneous nephrostolithotomy.  Therefore, the ureteroscope was 
removed and the cystoscope was back-loaded over the guidewire.  Before doing so
, however, I did inject contrast through the ureteroscope and was able to 
opacify the renal collecting system.  There was moderate dilation of all 
calices.  Otherwise, the collecting system was unremarkable.  Once the 
ureteroscope was removed, the cystoscope was back-loaded over the guidewire and 
a 4.7-[place]izen [place] by 24 cm hydrophilic ureteral stent advanced over the guidewire 
until it was properly positioned as seen fluoroscopically in the kidney and 
cystoscopically in the bladder.  The bladder was then drained of all return 
which was relatively clear.  The instruments were removed and 20 cc of 2% 
lidocaine injected transurethrally for postoperative analgesic purposes.  The 
patient was then awakened, extubated, transferred to his bed, then taken to the 
recovery room.  He tolerated the procedure well overall.



COMPLICATIONS:  None.



DISPOSITION:  He was transferred to the recovery room in stable condition and 
will be discharged with instructions to return to the office in approximately 2 
weeks for further discussion about percutaneous nephrostolithotomy.





Job #:  [id number]

MTDD

## 2018-04-04 ENCOUNTER — HOSPITAL ENCOUNTER (OUTPATIENT)
Dept: HOSPITAL 80 - FIMAGING | Age: 63
Discharge: HOME | End: 2018-04-04
Attending: SPECIALIST
Payer: COMMERCIAL

## 2018-04-04 VITALS
DIASTOLIC BLOOD PRESSURE: 81 MMHG | RESPIRATION RATE: 18 BRPM | SYSTOLIC BLOOD PRESSURE: 104 MMHG | OXYGEN SATURATION: 92 %

## 2018-04-04 VITALS — TEMPERATURE: 98.2 F

## 2018-04-04 VITALS — HEART RATE: 92 BPM

## 2018-04-04 DIAGNOSIS — Z96.0: ICD-10-CM

## 2018-04-04 DIAGNOSIS — N20.0: Primary | ICD-10-CM

## 2018-04-04 LAB
INR PPP: 0.92 (ref 0.83–1.16)
PLATELET # BLD: 223 10^3/UL (ref 150–400)
PROTHROMBIN TIME: 12.6 SEC (ref 12–15)

## 2018-04-04 PROCEDURE — 50432 PLMT NEPHROSTOMY CATHETER: CPT

## 2018-04-04 PROCEDURE — 99152 MOD SED SAME PHYS/QHP 5/>YRS: CPT

## 2018-04-04 PROCEDURE — BT121ZZ FLUOROSCOPY OF LEFT KIDNEY USING LOW OSMOLAR CONTRAST: ICD-10-PCS | Performed by: RADIOLOGY

## 2018-04-04 PROCEDURE — C1769 GUIDE WIRE: HCPCS

## 2018-04-04 PROCEDURE — C1729 CATH, DRAINAGE: HCPCS

## 2018-04-04 PROCEDURE — 0T9130Z DRAINAGE OF LEFT KIDNEY WITH DRAINAGE DEVICE, PERCUTANEOUS APPROACH: ICD-10-PCS | Performed by: RADIOLOGY

## 2018-04-04 NOTE — PDPROPOC
Sedation Plan of Care


Sedation Plan of Care: vital signs stable, mental status noted, patient 

educated of risks, benefits, alternatives, patient can tolerate sedation


ASA Classification: ASA 3


Planned drugs: fentanyl, midazolam, other


Mallampati Score: Class 4


Mallampati Reference Image: 





Patient passed 3-3-2 rule?: Yes

## 2018-04-04 NOTE — PDGENHP
History & Physical


Chief Complaint: LT RENAL CALCULUS


History of Present Illness: S/P RT STONE REMOVAL 10/17.  NOW HERE FOR LT STONE 

REMOVAL.


Pertinent Past, Social, Family History: S/P HEART STENTS, SLEEP APNEA. RT 

KIDNEY STONE REMOVAL; CARPAL TUNNEL BILATERALLY.


Relevant Physical Exam: OBESE.


Cardiorespiratory Assessment: RRR, CTA BILERALLY

## 2018-04-10 ENCOUNTER — HOSPITAL ENCOUNTER (OUTPATIENT)
Dept: HOSPITAL 80 - FIMAGING | Age: 63
Discharge: HOME | End: 2018-04-10
Attending: SPECIALIST
Payer: COMMERCIAL

## 2018-04-10 DIAGNOSIS — Z43.6: Primary | ICD-10-CM

## 2018-04-10 DIAGNOSIS — Z87.442: ICD-10-CM

## 2018-04-10 PROCEDURE — BT121ZZ FLUOROSCOPY OF LEFT KIDNEY USING LOW OSMOLAR CONTRAST: ICD-10-PCS | Performed by: RADIOLOGY

## 2018-04-10 PROCEDURE — 0TP5X0Z REMOVAL OF DRAINAGE DEVICE FROM KIDNEY, EXTERNAL APPROACH: ICD-10-PCS | Performed by: RADIOLOGY

## 2018-05-08 ENCOUNTER — HOSPITAL ENCOUNTER (OUTPATIENT)
Dept: HOSPITAL 80 - FIMAGING | Age: 63
End: 2018-05-08
Attending: SPECIALIST
Payer: COMMERCIAL

## 2018-05-08 DIAGNOSIS — K43.9: ICD-10-CM

## 2018-05-08 DIAGNOSIS — K42.9: ICD-10-CM

## 2018-05-08 DIAGNOSIS — I25.10: ICD-10-CM

## 2018-05-08 DIAGNOSIS — K76.0: Primary | ICD-10-CM

## 2018-07-12 ENCOUNTER — HOSPITAL ENCOUNTER (OUTPATIENT)
Dept: HOSPITAL 80 - FED | Age: 63
Setting detail: OBSERVATION
LOS: 1 days | Discharge: HOME | End: 2018-07-13
Attending: INTERNAL MEDICINE | Admitting: INTERNAL MEDICINE
Payer: COMMERCIAL

## 2018-07-12 DIAGNOSIS — E10.9: ICD-10-CM

## 2018-07-12 DIAGNOSIS — I10: ICD-10-CM

## 2018-07-12 DIAGNOSIS — I25.2: ICD-10-CM

## 2018-07-12 DIAGNOSIS — Z86.73: ICD-10-CM

## 2018-07-12 DIAGNOSIS — I25.119: Primary | ICD-10-CM

## 2018-07-12 DIAGNOSIS — E86.9: ICD-10-CM

## 2018-07-12 DIAGNOSIS — Z87.442: ICD-10-CM

## 2018-07-12 DIAGNOSIS — Z79.4: ICD-10-CM

## 2018-07-12 DIAGNOSIS — Z79.82: ICD-10-CM

## 2018-07-12 DIAGNOSIS — Z95.5: ICD-10-CM

## 2018-07-12 DIAGNOSIS — Q21.1: ICD-10-CM

## 2018-07-12 DIAGNOSIS — Z88.2: ICD-10-CM

## 2018-07-12 DIAGNOSIS — E78.5: ICD-10-CM

## 2018-07-12 DIAGNOSIS — F31.9: ICD-10-CM

## 2018-07-12 LAB
INR PPP: 1.04 (ref 0.83–1.16)
PLATELET # BLD: 223 10^3/UL (ref 150–400)
PLATELET # BLD: 232 10^3/UL (ref 150–400)
PROTHROMBIN TIME: 13.8 SEC (ref 12–15)

## 2018-07-12 PROCEDURE — C1887 CATHETER, GUIDING: HCPCS

## 2018-07-12 PROCEDURE — C1769 GUIDE WIRE: HCPCS

## 2018-07-12 PROCEDURE — C1760 CLOSURE DEV, VASC: HCPCS

## 2018-07-12 PROCEDURE — G0378 HOSPITAL OBSERVATION PER HR: HCPCS

## 2018-07-12 PROCEDURE — 93005 ELECTROCARDIOGRAM TRACING: CPT

## 2018-07-12 PROCEDURE — 71275 CT ANGIOGRAPHY CHEST: CPT

## 2018-07-12 PROCEDURE — C9600 PERC DRUG-EL COR STENT SING: HCPCS

## 2018-07-12 PROCEDURE — 93458 L HRT ARTERY/VENTRICLE ANGIO: CPT

## 2018-07-12 PROCEDURE — 71045 X-RAY EXAM CHEST 1 VIEW: CPT

## 2018-07-12 PROCEDURE — C1874 STENT, COATED/COV W/DEL SYS: HCPCS

## 2018-07-12 RX ADMIN — SODIUM CHLORIDE SCH MLS: 900 INJECTION, SOLUTION INTRAVENOUS at 20:05

## 2018-07-12 RX ADMIN — SODIUM CHLORIDE SCH: 900 INJECTION, SOLUTION INTRAVENOUS at 16:25

## 2018-07-12 NOTE — PDCARCONS
Cardiology Consult


Reason for Consult: Chest pain


Chief Complaint: Chest pain


History of Present Illness: 





Gordy is a 62-year-old male with a history of previous MI s/p 3 stents, 

hypertension, type I diabetes, CVA, and PFO. He presented to the emergency 

department today with a 3 week history of continuous left anterior chest 

discomfort. His chest pain is sharp and radiates to his back. He has associated 

shortness of breath, nausea, and diaphoresis. Gordy notes that his symptoms are 

worse with exertion and improve with rest. His symptoms were exacerbated today 

so he went to the ED. Gordy takes Aspirin daily, his last dose was last night. 

Gordy' symptoms are similar to when he had stents placed. 





History Information





- Allergies/Home Medication List


Allergies/Adverse Reactions: 








caffeine Allergy (Verified 03/29/18 16:31)


 Vomiting


linaclotide [From Linzess] Allergy (Verified 03/29/18 16:31)


 Abdominal Cramping


Sulfa (Sulfonamide Antibiotics) Allergy (Verified 03/29/18 16:31)


 Anaphylaxis





Home Medications: 








Metoprolol Succinate Xr [Toprol Xl 25 mg (*)] 25 mg PO HS 10/28/15 [Last Taken 

07/11/18]


Insulin Pump, Patient Own 1 ea MISC AD 09/09/16 [Last Taken 04/05/18 06:25 5 

units]


metFORMIN HCL [Glucophage 500 mg (*)] 1,000 mg PO BIDMEAL 09/09/16 [Last Taken 

07/12/18]


ARIPiprazole [Abilify 5 mg (*)] 5 mg PO HS 07/14/17 [Last Taken 07/11/18]


FLUoxetine [Prozac 10 MG (*)] 10 mg PO DAILY 07/14/17 [Last Taken 07/12/18]


FLUoxetine [Prozac 20 MG (*)] 20 mg PO DAILY 07/14/17 [Last Taken 07/12/18]


Losartan Potassium [Cozaar 50 mg (*)] 100 mg PO HS 10/02/17 [Last Taken 07/11/18

]


Pantoprazole Sodium [Protonix 40mg (*)] 40 mg PO DAILY 10/02/17 [Last Taken 07/ 12/18]


buPROPion XL [Wellbutrin 150mg XL] 300 mg PO DAILY 10/19/17 [Last Taken 07/12/18

]


Amphet Asp and D/Amphet [Adderall 10 MG (*)] 10 mg PO BID@06,1830 03/16/18 [

Last Taken 07/12/18]


Clopidogrel Bisulfate [Plavix (*)] 75 mg PO HS 07/12/18 [Last Taken 07/11/18]


Hydrochlorothiazide [HCTZ (*)] 25 mg PO DAILY 07/12/18 [Last Taken 07/11/18]


Simvastatin [Zocor] 20 mg PO HS 07/12/18 [Last Taken 07/11/18]





I have personally reviewed and updated: family history, medical history, social 

history, surgical history


Past Medical History: 








- Past Medical History


coronary artery disease, CVA, diabetes type 1, hypertension, hyperlipidemia





- Surgical History


Reports: coronary stent





- Social History


Smoking Status: Never smoked


Additional social history: 





Cardiac History





- Cardiac History


Past Cardiac History: CAD, PCI


Cardiac Risk Factors: hypertension (>140/90), lipidemia, diabetes mellitus, male


Timing/Duration: Weeks


Severity: moderate


Location: substernal


Activities at Onset: activity


Modifying Factors: improves with: exercise (Worsens), rest (Improves)


Associated Symptoms: diaphoresis, nausea/vomiting, shortness of breath





FERNANDA Risk Evaluation


age greater or equal to 65: no


greater or equal to 3 CAD risk factors: yes


known CAD(stenosis greater or eqaul to 50%): yes


ASA use in past 7 days: yes


severe angina(greater or equal to 2 episodes in 24hrs): yes


EKG ST changes greater or equal to 0.5mm: no


positive cardiac marker: no


Total Score: 4


FERNANDA Score: 19.9% risk





Physical Exam


Physical Exam: 

















Temp Pulse Resp BP Pulse Ox


 


 36.9 C   83   19   122/73 H  99 


 


 07/12/18 08:45  07/12/18 10:03  07/12/18 10:03  07/12/18 10:03  07/12/18 10:03




















O2 (L/minute)                  2














Constitutional: no apparent distress, appears nourished


Eyes: PERRL, anicteric sclera, EOMI


Ears, Nose, Mouth, Throat: moist mucous membranes


Cardiovascular: regular rate and rhythym, no murmur, rub, or gallop


Respiratory: no respiratory distress, no rales or rhonchi


Gastrointestinal: normoactive bowel sounds


Skin: warm, normal color


Musculoskeletal: no muscle tenderness


Neurologic: AAOx3


Psychiatric: interacting appropriately, not anxious





Lab and Imaging





 07/12/18 06:57





 07/12/18 06:57














WBC  4.88 10^3/uL (3.80-9.50)   07/12/18  06:57    


 


RBC  4.60 10^6/uL (4.40-6.38)   07/12/18  06:57    


 


Hgb  12.6 g/dL (13.7-17.5)  L  07/12/18  06:57    


 


Hct  38.1 % (40.0-51.0)  L  07/12/18  06:57    


 


MCV  82.8 fL (81.5-99.8)   07/12/18  06:57    


 


MCH  27.4 pg (27.9-34.1)  L  07/12/18  06:57    


 


MCHC  33.1 g/dL (32.4-36.7)   07/12/18  06:57    


 


RDW  15.4 % (11.5-15.2)  H  07/12/18  06:57    


 


Plt Count  232 10^3/uL (150-400)   07/12/18  06:57    


 


MPV  9.7 fL (8.7-11.7)   07/12/18  06:57    


 


Neut % (Auto)  58.7 % (39.3-74.2)   07/12/18  06:57    


 


Lymph % (Auto)  27.3 % (15.0-45.0)   07/12/18  06:57    


 


Mono % (Auto)  10.5 % (4.5-13.0)   07/12/18  06:57    


 


Eos % (Auto)  2.5 % (0.6-7.6)   07/12/18  06:57    


 


Baso % (Auto)  0.4 % (0.3-1.7)   07/12/18  06:57    


 


Nucleat RBC Rel Count  0.0 % (0.0-0.2)   07/12/18  06:57    


 


Absolute Neuts (auto)  2.87 10^3/uL (1.70-6.50)   07/12/18  06:57    


 


Absolute Lymphs (auto)  1.33 10^3/uL (1.00-3.00)   07/12/18  06:57    


 


Absolute Monos (auto)  0.51 10^3/uL (0.30-0.80)   07/12/18  06:57    


 


Absolute Eos (auto)  0.12 10^3/uL (0.03-0.40)   07/12/18  06:57    


 


Absolute Basos (auto)  0.02 10^3/uL (0.02-0.10)   07/12/18  06:57    


 


Absolute Nucleated RBC  0.00 10^3/uL (0-0.01)   07/12/18  06:57    


 


Immature Gran %  0.6 % (0.0-1.1)   07/12/18  06:57    


 


Immature Gran #  0.03 10^3/uL (0.00-0.10)   07/12/18  06:57    


 


D-Dimer  < 0.27 ug/mLFEU (0.00-0.50)   07/12/18  07:54    


 


Sodium  137 mEq/L (135-145)   07/12/18  06:57    


 


Potassium  3.3 mEq/L (3.3-5.0)   07/12/18  06:57    


 


Chloride  102 mEq/L ()   07/12/18  06:57    


 


Carbon Dioxide  24 mEq/l (22-31)   07/12/18  06:57    


 


Anion Gap  11 mEq/L (8-16)   07/12/18  06:57    


 


BUN  18 mg/dL (7-23)   07/12/18  06:57    


 


Creatinine  1.0 mg/dL (0.7-1.3)   07/12/18  06:57    


 


Estimated GFR  > 60   07/12/18  06:57    


 


Glucose  139 mg/dL ()  H  07/12/18  06:57    


 


POC Glucose  96 mg/dL ()   07/12/18  12:08    


 


Calcium  10.1 mg/dL (8.5-10.4)   07/12/18  06:57    


 


POC Troponin I  0.01 ng/mL (0.00-0.08)   07/12/18  07:01    








Visualized and Interpreted Chest x-ray results: Yes


Chest X-ray Interpretation: normal


Visualized and Interpreted EKG results: Yes


EKG Interpretation: Positive for: normal sinsus rhythm


EKG additional interpertation: No ST elevation or depression





A/P


Assessment: 





Gordy presented to the Emergency Department with 3 weeks of ongoing chest pain 

that increased in severity today. He has a history of MI with 3 previous stents 

as well as CVA. His symptoms feel similar to when he had prior stents placed. 

His EKG performed in the ED shows sinus rhythm, no ST/T changes. Normal QRS. 

His Troponin is normal and CT chest is negative for PE. Given the patient's 

cardiac history and acute clinical symptoms of angina, I recommend cardiac 

catheterization for a formal assessment of coronary artery disease. 





I have explained the risks, expected benefits and potential complications of 

this course of action with the patient and he wishes to proceed as planned. 

Some potential benefits include angina relief, definitive assessment of 

coronary anatomy and LV function. Complications have been described as death, 

permanent and disabling stroke, heart attack, abnormal heart rhythm, bleeding 

and damage to blood vessels resulting in tissue or limb loss. 


Plan: 





We will proceed with cardiac catheterization today.

## 2018-07-12 NOTE — CPEKG
Heart Rate: 74

RR Interval: 811

P-R Interval: 188

QRSD Interval: 76

QT Interval: 404

QTC Interval: 449

P Axis: 57

QRS Axis: 41

T Wave Axis: 15

EKG Severity - NORMAL ECG -

EKG Impression: SINUS RHYTHM

Electronically Signed By: Warner Taveras 12-Jul-2018 21:09:03

## 2018-07-12 NOTE — EDPHY
H & P


Time Seen by Provider: 07/12/18 06:58


HPI/ROS: 





Chief complaint.  Chest pain





HPI.  62-year-old male with previous MI and CVA presents with 3 week history of 

continuous left anterior chest discomfort.  He describes as sharp and radiating 

through to his back.  He has associated shortness of breath and nausea and 

sweating.  No fever cough.  His symptoms are worse with exertion and relieved 

by rest.  He states these symptoms are similar to previous MI. His symptoms are 

worse today so he came to the emergency department.  He takes aspirin daily and 

took aspirin last night.  He has no unusual leg pain or swelling though pain in 

his legs with exertion.  No abdominal pain other than nausea.





ROS


Constitutional.  no fever/chills, no weakness


Eyes.  no problems with vision


ENT.  no sore throat, no nasal drainage


Cardiovascular.  Left anterior chest pain


Respiratory.  Exertional dyspnea no cough


Abdominal.  No abdominal pain but nausea


.  no problems urinating


MS.  Leg pain with ambulation


Skin.  Diaphoresis


Lymph.  no swollen glands


Neuro.  no headache, no dizziness, no difficulty walking or with speech


Past Medical/Surgical History: 





Past medical history significant for CVA, MI x3 with stents.  Hypertension, 

diabetes on insulin, kidney stones, myasthenia gravis, PFO, bipolar illness


Social History: 





, nonsmoker, no alcohol


Smoking Status: Never smoked


Physical Exam: 





General Appearance:  Alert well-developed male moderate distress vital signs 

are stable


Eyes: Pupils equal and round no pallor or injection.


ENT, Mouth:  Mucous membranes are moist.


Respiratory:  There are no retractions, lungs are clear to auscultation.


Cardiovascular: Regular rate and rhythm.


Gastrointestinal:   Abdomen is soft and nontender, no masses, bowel sounds 

normal.


Neurological:  Awake and alert, sensory and motor exams grossly normal.


Skin:  Diaphoretic


Musculoskeletal:  Neck is supple nontender.


Extremities  symmetrical, full range of motion.


Psychiatric: Patient is oriented X 3, there is no agitation.


Constitutional: 


 Initial Vital Signs











Temperature (C)  37.0 C   07/12/18 06:53


 


Heart Rate  87   07/12/18 06:53


 


Respiratory Rate  20   07/12/18 06:53


 


Blood Pressure  149/81 H  07/12/18 06:53


 


O2 Sat (%)  98   07/12/18 06:53








 











O2 Delivery Mode               Room Air














Allergies/Adverse Reactions: 


 





caffeine Allergy (Verified 03/29/18 16:31)


 Vomiting


linaclotide [From Linzess] Allergy (Verified 03/29/18 16:31)


 Abdominal Cramping


Sulfa (Sulfonamide Antibiotics) Allergy (Verified 03/29/18 16:31)


 Anaphylaxis








Home Medications: 














 Medication  Instructions  Recorded


 


Metoprolol Succinate Xr [Toprol Xl 25 mg PO HS 10/28/15





25 mg (*)]  


 


Insulin Pump, Patient Own 1 ea MISC AD 09/09/16


 


metFORMIN HCL [Glucophage 500 mg 1,000 mg PO BIDMEAL 09/09/16





(*)]  


 


ARIPiprazole [Abilify 5 mg (*)] 5 mg PO HS 07/14/17


 


FLUoxetine [Prozac 10 MG (*)] 10 mg PO DAILY 07/14/17


 


FLUoxetine [Prozac 20 MG (*)] 20 mg PO DAILY 07/14/17


 


Losartan Potassium [Cozaar 50 mg 100 mg PO HS 10/02/17





(*)]  


 


Pantoprazole Sodium [Protonix 40mg 40 mg PO DAILY 10/02/17





(*)]  


 


buPROPion XL [Wellbutrin 150mg XL] 300 mg PO DAILY 10/19/17


 


Amphet Asp and D/Amphet [Adderall 10 mg PO BID@06,1830 03/16/18





10 MG (*)]  


 


Clopidogrel Bisulfate [Plavix (*)] 75 mg PO HS 07/12/18


 


Hydrochlorothiazide [HCTZ (*)] 25 mg PO DAILY 07/12/18


 


Simvastatin [Zocor] 20 mg PO HS 07/12/18














Medical Decision Making





- Diagnostics


EKG Interpretation: 





EKG interpreted by me shows normal sinus rhythm normal interval and axis.  QRS 

is normal there is no significant ST elevation or depression.  No arrhythmia.  

The rate is 88





Monitor at times appears to show an atrial flutter type pattern


Imaging Results: 


 Imaging Impressions





Chest X-Ray  07/12/18 06:53


Impression:


1. No acute pulmonary disease.


2. Consider chest two views when the patient's medical condition permits.








Chest/Thorax CTA  07/12/18 07:45


Impression:  


 


1. There is no CT evidence of pulmonary artery thromboemboli.


2. Mild cardiomegaly, with left anterior descending coronary artery stenting. 

There is no evidence of congestive heart failure.


3. Small-to-moderate central sliding hiatal hernia.


4. Stable borderline-splenomegaly.


5. Stable 15 mm right adrenal gland adenoma.


 


Findings were discussed with POLI MALIK MD at 9:47, on 7/12/2018.


 








One-view chest x-ray interpreted by me is normal





Chest CT shows no evidence of pulmonary embolus or aortic dissection or aneurysm


Procedures: 





IV normal saline, monitor





Patient has poor reaction to nitrates





Morphine and Zofran IV


ED Course/Re-evaluation: 





Point of care testing troponin is negative





Re-evaluation at 7:50 a.m..  Patient is more comfortable after the morphine.  

The patient, his wife, and I discussed imaging lab EKG results.  We discussed 

treatment plan including recommendation for CT of his chest looking for 

pulmonary embolus as well as large vessel disease.  Patient expresses 

understanding and agreement 





I consulted and discussed case with Dr. Bloom, hospitalist, who agrees to 

the admission


Differential Diagnosis: 





Patient has a cardiac and neurologic history with MIs and CVAs in the past.  

Patient has had 3 weeks of chest discomfort that is worse with exertion and has 

shortness of breath with exertion as well as nausea and diaphoresis with 

exertion.  Patient's EKG is nonacute and his troponin is normal.  I have also 

considered pulmonary embolus as well as pneumonia and great vessel pathology 

including aneurysm and dissection of the aorta.  Plan is admission





- Data Points


Laboratory Results: 


 Laboratory Results





 07/12/18 06:57 





 07/12/18 06:57 





 











  07/12/18 07/12/18 07/12/18





  07:54 07:01 06:57


 


WBC      





    


 


RBC      





    


 


Hgb      





    


 


Hct      





    


 


MCV      





    


 


MCH      





    


 


MCHC      





    


 


RDW      





    


 


Plt Count      





    


 


MPV      





    


 


Neut % (Auto)      





    


 


Lymph % (Auto)      





    


 


Mono % (Auto)      





    


 


Eos % (Auto)      





    


 


Baso % (Auto)      





    


 


Nucleat RBC Rel Count      





    


 


Absolute Neuts (auto)      





    


 


Absolute Lymphs (auto)      





    


 


Absolute Monos (auto)      





    


 


Absolute Eos (auto)      





    


 


Absolute Basos (auto)      





    


 


Absolute Nucleated RBC      





    


 


Immature Gran %      





    


 


Immature Gran #      





    


 


D-Dimer  < 0.27 ug/mLFEU ug/mLFEU    





   (0.00-0.50)   


 


Sodium      137 mEq/L mEq/L





     (135-145) 


 


Potassium      3.3 mEq/L mEq/L





     (3.3-5.0) 


 


Chloride      102 mEq/L mEq/L





     () 


 


Carbon Dioxide      24 mEq/l mEq/l





     (22-31) 


 


Anion Gap      11 mEq/L mEq/L





     (8-16) 


 


BUN      18 mg/dL mg/dL





     (7-23) 


 


Creatinine      1.0 mg/dL mg/dL





     (0.7-1.3) 


 


Estimated GFR      > 60 





    


 


Glucose      139 mg/dL H mg/dL





     () 


 


Calcium      10.1 mg/dL mg/dL





     (8.5-10.4) 


 


POC Troponin I    0.01 ng/mL ng/mL  





    (0.00-0.08)  














  07/12/18 07/12/18





  06:57 06:53


 


WBC  4.88 10^3/uL 10^3/uL  





   (3.80-9.50)  


 


RBC  4.60 10^6/uL 10^6/uL  





   (4.40-6.38)  


 


Hgb  12.6 g/dL L g/dL  





   (13.7-17.5)  


 


Hct  38.1 % L %  





   (40.0-51.0)  


 


MCV  82.8 fL fL  





   (81.5-99.8)  


 


MCH  27.4 pg L pg  





   (27.9-34.1)  


 


MCHC  33.1 g/dL g/dL  





   (32.4-36.7)  


 


RDW  15.4 % H %  





   (11.5-15.2)  


 


Plt Count  232 10^3/uL 10^3/uL  





   (150-400)  


 


MPV  9.7 fL fL  





   (8.7-11.7)  


 


Neut % (Auto)  58.7 % %  





   (39.3-74.2)  


 


Lymph % (Auto)  27.3 % %  





   (15.0-45.0)  


 


Mono % (Auto)  10.5 % %  





   (4.5-13.0)  


 


Eos % (Auto)  2.5 % %  





   (0.6-7.6)  


 


Baso % (Auto)  0.4 % %  





   (0.3-1.7)  


 


Nucleat RBC Rel Count  0.0 % %  





   (0.0-0.2)  


 


Absolute Neuts (auto)  2.87 10^3/uL 10^3/uL  





   (1.70-6.50)  


 


Absolute Lymphs (auto)  1.33 10^3/uL 10^3/uL  





   (1.00-3.00)  


 


Absolute Monos (auto)  0.51 10^3/uL 10^3/uL  





   (0.30-0.80)  


 


Absolute Eos (auto)  0.12 10^3/uL 10^3/uL  





   (0.03-0.40)  


 


Absolute Basos (auto)  0.02 10^3/uL 10^3/uL  





   (0.02-0.10)  


 


Absolute Nucleated RBC  0.00 10^3/uL 10^3/uL  





   (0-0.01)  


 


Immature Gran %  0.6 % %  





   (0.0-1.1)  


 


Immature Gran #  0.03 10^3/uL 10^3/uL  





   (0.00-0.10)  


 


D-Dimer    REJ 





   


 


Sodium    





   


 


Potassium    





   


 


Chloride    





   


 


Carbon Dioxide    





   


 


Anion Gap    





   


 


BUN    





   


 


Creatinine    





   


 


Estimated GFR    





   


 


Glucose    





   


 


Calcium    





   


 


POC Troponin I    





   











Medications Given: 


 








Discontinued Medications





Aspirin Buffered (Aspirin Ec)  325 mg PO ONCALL ONE


   Stop: 07/12/18 11:19


   Last Admin: 07/12/18 12:41 Dose:  325 mg


Diazepam (Valium)  5 mg PO ONCALL ONE


   Stop: 07/12/18 11:19


   Last Admin: 07/12/18 12:41 Dose:  5 mg


Famotidine (Pepcid)  20 mg PO ONCALL ONE


   Stop: 07/12/18 11:19


   Last Admin: 07/12/18 12:41 Dose:  20 mg


Hydromorphone HCl (Dilaudid)  0.5 mg IVP EDNOW ONE


   Stop: 07/12/18 08:53


   Last Admin: 07/12/18 08:56 Dose:  0.5 mg


Sodium Chloride (Ns)  1,000 mls @ 0 mls/hr IV ONCE ONE; Wide Open


   PRN Reason: Protocol


   Stop: 07/12/18 07:46


   Last Admin: 07/12/18 07:58 Dose:  1,000 mls


Sodium Chloride (Ns)  1,000 mls @ 0 mls/hr IV ONCE ONE; Wide Open


   PRN Reason: Protocol


   Stop: 07/12/18 08:19


   Last Admin: 07/12/18 08:19 Dose:  Not Given


Morphine Sulfate (Morphine)  6 mg IVP EDNOW ONE


   Stop: 07/12/18 07:24


   Last Admin: 07/12/18 07:32 Dose:  6 mg


Ondansetron HCl (Zofran)  4 mg IVP EDNOW ONE


   Stop: 07/12/18 07:24


   Last Admin: 07/12/18 07:33 Dose:  4 mg





Point of Care Test Results: 


 Chemistry











  07/12/18





  07:01


 


POC Troponin I  0.01 ng/mL ng/mL





   (0.00-0.08) 














Departure





- Departure


Disposition: [facility] Inpatient Acute


Clinical Impression: 


Chest pain


Qualifiers:


 Chest pain type: unspecified Qualified Code(s): R07.9 - Chest pain, unspecified





Condition: Good

## 2018-07-12 NOTE — CPEKG
Heart Rate: 88

RR Interval: 682

P-R Interval: 180

QRSD Interval: 82

QT Interval: 372

QTC Interval: 450

P Axis: 63

QRS Axis: 32

T Wave Axis: 4

EKG Severity - NORMAL ECG -

EKG Impression: SINUS RHYTHM

Electronically Signed By: Rod Sadler 12-Jul-2018 08:13:26

## 2018-07-12 NOTE — PDPROPOC
Sedation Plan of Care


Sedation Plan of Care: vital signs stable, mental status noted, patient 

educated of risks, benefits, alternatives, patient can tolerate sedation


ASA Classification: ASA 3


Planned drugs: fentanyl, midazolam, other


Mallampati Score: Class 4


Mallampati Reference Image: 





Patient passed 3-3-2 rule?: No (Sleep apnea, difficult airway)

## 2018-07-12 NOTE — PDDXCAT
Diagnostic Cath Note





- .


Date: 07/12/18


: Damien


Indication: CCC Class III and IV angina on medical treatment





- Procedure


Access: right groin


Procedure: left heart catheterization, coronary angiography, left ventriculogram





- Materials


Left Heart Cath size: 6F


Left Heart Cath materials: standard multipack (JL4, JR4, pigtail)





- Findings-Left Heart Catheterization


LM: The LM is 7mm in size. It bifurcates into an LAD and Circumflex system.


LAD: LAD is 3.5 mm in size and previously stented. There is a 70% eccentric 

lesion distal to the previously placed stent, which was sumewhat difficult to 

image secondary to overlap from the dominant circumflex. FERNANDA III flow present. 

There is a 60% ostial diagonal lesion. The diagonal comes off within the 

stented segment. There is FERNANDA III flow to the distal vessel.


LCX: The circumflex is 4mm in size and dominant. There are luminal 

irregularities consistent with atherosclerosis. No flow limiting obstruction is 

identified.


RCA: The RCA is 3mm in size. It is non-dominant and free of flow limiting 

disease.


EDP: 19mmHg


LVEF: The EF is 65%.


Wall motion: On the LV gram there is normal LV systolic function. The EF is 65%

. There are no resting segmental wall motion abnormalities. The visualized 

portion of the thoracic aortic valve reveals three sinuses of valsalva most 

consistent with a trileaflet valve. There is no gradient on pullback across the 

aortic valve. There is no evidence of luisa dissection or aneurysm formation.


Complications: NONE.


Estimated blood loss: <50ml


Closure method: Angioseal


Assessment: The patient has native vessel coronary disease. His LAD has been 

previously stented. There is a 70% obstruction of the mid LAD distal to the 

previous stent. This lesion was stented with a drug eluting stent. FERNANDA III 

flow pre and post stent implantation.


Plan: 


Dual antiplatelet therapy with Aspirin 325mg for the first month followed by 

Aspirin 81mg along with Plavix 75mg daily should be continued for at least 1 

year following drug eluting stent implantation. No elective surgery for the 

first 3 months. Decisions to stop dual antiplatelet therapy before 1 year 

should involve our office [facility]. 


Intervention: 





A 6 Bulgarian JL4 guiding catheter was used for guide catheter support. A 0.014 

Intuition Wire was advanced across the lesion in the mid LAD under direct 

fluoroscopic and angiographic guidance. The lesion was primarily stented with a 

Synergy 3.0 x 28 mm drug eluting stent. Balloon was inflated for 25 seconds at 

15 nagi in the mid LAD X 2. There was 0% residual stenosis. FERNANDA III flow pre 

and post stent implantation. 


Patient Problems: 


 Problems











Problem Status Onset


 


Chest pain Acute  


 


CAD (coronary artery disease) Acute  


 


History of coronary artery disease Acute  


 


Obstructive uropathy Acute

## 2018-07-12 NOTE — CPEKG
Heart Rate: 76

RR Interval: 789

P-R Interval: 192

QRSD Interval: 80

QT Interval: 396

QTC Interval: 446

P Axis: 63

QRS Axis: 23

T Wave Axis: 0

EKG Severity - NORMAL ECG -

EKG Impression: SINUS RHYTHM

Electronically Signed By: Warner Taveras 12-Jul-2018 16:37:15

## 2018-07-13 VITALS — SYSTOLIC BLOOD PRESSURE: 143 MMHG | DIASTOLIC BLOOD PRESSURE: 72 MMHG

## 2018-07-13 NOTE — GDS
[f rep st]



                                                             DISCHARGE SUMMARY





ADMISSION DIAGNOSES:  

1.  Coronary artery disease status post PTCA and stent placement.

2.  Hypertension.

3.  Diabetes mellitus.

4.  Dyslipidemia.



DISCHARGE DIAGNOSES:  

1.  Coronary artery disease status post PTCA and stent placement of the left anterior descending. 

2.  Hypertension.

3.  Diabetes.

4.  Dyslipidemia.



HISTORY OF PRESENT ILLNESS:  For a detailed history of present illness, please see my cardiology cons
ultation from yesterday, which should suffice as the patient's H and P.  Briefly, Mr. Gordy Stark 
has experienced worsening chest discomfort, pressure and tightness for the past 3 weeks.  The pain is
 severe and radiates from the front into the back of his chest, and is reminiscent of a prior pain wh
en he had a myocardial infarction.  He was ultimately admitted to the hospital.  I was asked to see t
he patient in consultation, but ultimately became the admitting physician.  The patient underwent car
diac catheterization, was found to have a 70% lesion of the LAD distal to the previously placed stent
s.  For details of that procedure, please see the recently dictated operative report.  The patient di
d receive a drug-eluting stent yesterday.  The patient's chest pain prior to the procedure was 7/10 a
nd as severe as 10/10 on his arrival to the emergency department.  His EKG and troponin were negative
 for ischemia or evidence of myocardial injury.



HOSPITAL COURSE:  The patient was admitted overnight following the cardiac catheterization and stent 
implantation.  On the morning of his discharge from the hospital, he is medically stable and ready fo
r discharge to home.



DISCHARGE MEDICATIONS:  To include aspirin 325 mg to be continued for the next 1 month, followed by a
spirin 81 mg to be used in combination with Plavix or clopidogrel 75 mg p.o. at bedtime.  He should b
e on dual antiplatelet therapy for at least 1 year post stent implantation, metoprolol succinate XR 2
5 mg p.o. at bedtime.  Metformin 1000 p.o. twice daily will be held for another 48 hours and resumed 
on Monday morning, insulin pump, which will be continued.  Fluoxetine, a total of 30 mg daily, Abilif
y 5 mg p.o. at bedtime, Protonix 40 mg p.o. daily, losartan 50 mg tablets 100 mg p.o. daily.  Bupropi
on  mg 300 mg total each day, and Adderall 10 mg p.o. twice daily, simvastatin 20 mg p.o. at be
dtime, hydrochlorothiazide 25 mg p.o. daily.



ACTIVITY:  His activity is to be limited by groin precautions.  I would like for him to avoid doing a
nything strenuous or doing anything to get red in the face, hot or sweaty over the next 7 to 10 days.
  He knows to report promptly to the emergency department should he experience chest discomfort, pres
sure, tightness, or other clinical symptoms of concern.  I explained to the patient that it would be 
unusual to have a 70% lesion in the coronary cause resting pain of the severity that he described.  I
t is somewhat interesting to me that his chest pain is completely resolved post stent implantation.  
Again, a somewhat unusual presentation for an acute coronary syndrome with negative troponin and EKG.
  Again, the patient is to return promptly to the emergency department should he experience chest adrien
n, pressure, tightness, or other clinical symptoms of concern.  I have asked that he follow with Dr. Parks, as well as with Dr. Darryl Lancaster.  He is to call our office and obtain a visit in the next 7 t
o 10 days, and it will be fine for him to see a mid-level provider to check on his groin and allow hi
m to proceed with more aggressive exercise in the near future.





Job #:  [id number]

## 2018-11-27 ENCOUNTER — HOSPITAL ENCOUNTER (OUTPATIENT)
Dept: HOSPITAL 80 - MNOR | Age: 63
End: 2018-11-27
Attending: INTERNAL MEDICINE
Payer: COMMERCIAL

## 2018-11-27 DIAGNOSIS — R13.10: Primary | ICD-10-CM

## 2018-11-27 DIAGNOSIS — K22.70: ICD-10-CM

## 2018-11-27 DIAGNOSIS — K44.9: ICD-10-CM

## 2022-02-07 NOTE — GDS
[f rep st]



                                                             DISCHARGE SUMMARY





ALL DIAGNOSES:  

1.  Symptomatic nephrolithiasis status post stone extraction and stent placement by Dr. Valencia, comp
licated by small distal ureteral perforation.

2.  Acute kidney injury.

3.  Diabetes mellitus type 2.

4.  Coronary artery disease, status post stents last in March of 2016.

5.  History of a cerebrovascular accident. 

6.  Hypertension.



HOSPITAL COURSE:  A 61-year-old man with vascular disease presents with symptomatic renal stones.  H
e underwent stone extraction, as well as stent placement by Dr. Valencia.  There was a small ureteral 
perforation, which has been asymptomatic for the patient. He presented with acute kidney injury, thi
s has resolved with removal of the stones, as well as IV hydration.  He has coronary artery disease.
  His aspirin and Plavix were held for 2 days.  These will be restarted on the day of discharge.  I 
have recommended that he speak with Dr. Lancaster regarding his aspirin doses.  He is on aspirin 325 mg
.  He has not had any chest pain or anything else concerning.  He has a history of a CVA, is on appr
opriate medications.  For his diabetes, he is on an insulin pump and his glucoses have been well con
trolled.  A1c is 6.8. 



I discussed this with Dr. Valencia. He is in agreement.  He should follow up with Dr. Valencia in 3 week
s for stent removal.



BILLING:  I spent more than 30 minutes on the day of discharge coordinating care.





Job #:  [id number] [id number] Flap Text: The defect edges were debeveled with a #15 scalpel blade.  Given the location of the defect, shape of the defect and the proximity to free margins a V-Y flap was deemed most appropriate.  Using a sterile surgical marker, an appropriate advancement flap was drawn incorporating the defect and placing the expected incisions within the relaxed skin tension lines where possible.    The area thus outlined was incised deep to adipose tissue with a #15 scalpel blade.  The skin margins were undermined to an appropriate distance in all directions utilizing iris scissors.